# Patient Record
Sex: FEMALE | Race: WHITE | NOT HISPANIC OR LATINO | Employment: FULL TIME | URBAN - NONMETROPOLITAN AREA
[De-identification: names, ages, dates, MRNs, and addresses within clinical notes are randomized per-mention and may not be internally consistent; named-entity substitution may affect disease eponyms.]

---

## 2023-04-22 LAB
ALANINE AMINOTRANSFERASE (SGPT) (U/L) IN SER/PLAS: 22 U/L (ref 7–45)
ALBUMIN (G/DL) IN SER/PLAS: 3.9 G/DL (ref 3.4–5)
ALKALINE PHOSPHATASE (U/L) IN SER/PLAS: 81 U/L (ref 33–136)
ANION GAP IN SER/PLAS: 13 MMOL/L (ref 10–20)
ASPARTATE AMINOTRANSFERASE (SGOT) (U/L) IN SER/PLAS: 22 U/L (ref 9–39)
BASOPHILS (10*3/UL) IN BLOOD BY AUTOMATED COUNT: 0.04 X10E9/L (ref 0–0.1)
BASOPHILS/100 LEUKOCYTES IN BLOOD BY AUTOMATED COUNT: 0.6 % (ref 0–2)
BILIRUBIN TOTAL (MG/DL) IN SER/PLAS: 0.5 MG/DL (ref 0–1.2)
CALCIDIOL (25 OH VITAMIN D3) (NG/ML) IN SER/PLAS: 53 NG/ML
CALCIUM (MG/DL) IN SER/PLAS: 9.3 MG/DL (ref 8.6–10.6)
CARBON DIOXIDE, TOTAL (MMOL/L) IN SER/PLAS: 28 MMOL/L (ref 21–32)
CHLORIDE (MMOL/L) IN SER/PLAS: 104 MMOL/L (ref 98–107)
CHOLESTEROL (MG/DL) IN SER/PLAS: 125 MG/DL (ref 0–199)
CHOLESTEROL IN HDL (MG/DL) IN SER/PLAS: 48.2 MG/DL
CHOLESTEROL/HDL RATIO: 2.6
CREATININE (MG/DL) IN SER/PLAS: 0.88 MG/DL (ref 0.5–1.05)
EOSINOPHILS (10*3/UL) IN BLOOD BY AUTOMATED COUNT: 0.14 X10E9/L (ref 0–0.7)
EOSINOPHILS/100 LEUKOCYTES IN BLOOD BY AUTOMATED COUNT: 2 % (ref 0–6)
ERYTHROCYTE DISTRIBUTION WIDTH (RATIO) BY AUTOMATED COUNT: 13.2 % (ref 11.5–14.5)
ERYTHROCYTE MEAN CORPUSCULAR HEMOGLOBIN CONCENTRATION (G/DL) BY AUTOMATED: 31 G/DL (ref 32–36)
ERYTHROCYTE MEAN CORPUSCULAR VOLUME (FL) BY AUTOMATED COUNT: 98 FL (ref 80–100)
ERYTHROCYTES (10*6/UL) IN BLOOD BY AUTOMATED COUNT: 4.59 X10E12/L (ref 4–5.2)
GFR FEMALE: 72 ML/MIN/1.73M2
GLUCOSE (MG/DL) IN SER/PLAS: 97 MG/DL (ref 74–99)
HEMATOCRIT (%) IN BLOOD BY AUTOMATED COUNT: 45.1 % (ref 36–46)
HEMOGLOBIN (G/DL) IN BLOOD: 14 G/DL (ref 12–16)
IMMATURE GRANULOCYTES/100 LEUKOCYTES IN BLOOD BY AUTOMATED COUNT: 0.3 % (ref 0–0.9)
LDL: 49 MG/DL (ref 0–99)
LEUKOCYTES (10*3/UL) IN BLOOD BY AUTOMATED COUNT: 6.9 X10E9/L (ref 4.4–11.3)
LYMPHOCYTES (10*3/UL) IN BLOOD BY AUTOMATED COUNT: 1.77 X10E9/L (ref 1.2–4.8)
LYMPHOCYTES/100 LEUKOCYTES IN BLOOD BY AUTOMATED COUNT: 25.7 % (ref 13–44)
MONOCYTES (10*3/UL) IN BLOOD BY AUTOMATED COUNT: 0.6 X10E9/L (ref 0.1–1)
MONOCYTES/100 LEUKOCYTES IN BLOOD BY AUTOMATED COUNT: 8.7 % (ref 2–10)
NEUTROPHILS (10*3/UL) IN BLOOD BY AUTOMATED COUNT: 4.31 X10E9/L (ref 1.2–7.7)
NEUTROPHILS/100 LEUKOCYTES IN BLOOD BY AUTOMATED COUNT: 62.7 % (ref 40–80)
NRBC (PER 100 WBCS) BY AUTOMATED COUNT: 0 /100 WBC (ref 0–0)
PLATELETS (10*3/UL) IN BLOOD AUTOMATED COUNT: 120 X10E9/L (ref 150–450)
POTASSIUM (MMOL/L) IN SER/PLAS: 4.1 MMOL/L (ref 3.5–5.3)
PROTEIN TOTAL: 6.7 G/DL (ref 6.4–8.2)
SODIUM (MMOL/L) IN SER/PLAS: 141 MMOL/L (ref 136–145)
TRIGLYCERIDE (MG/DL) IN SER/PLAS: 138 MG/DL (ref 0–149)
UREA NITROGEN (MG/DL) IN SER/PLAS: 16 MG/DL (ref 6–23)
VLDL: 28 MG/DL (ref 0–40)

## 2023-06-22 ENCOUNTER — APPOINTMENT (OUTPATIENT)
Dept: PRIMARY CARE | Facility: CLINIC | Age: 68
End: 2023-06-22
Payer: MEDICARE

## 2023-07-12 DIAGNOSIS — G47.33 OSA (OBSTRUCTIVE SLEEP APNEA): ICD-10-CM

## 2023-07-12 PROBLEM — E78.5 DYSLIPIDEMIA: Status: ACTIVE | Noted: 2023-07-12

## 2023-07-12 PROBLEM — Q23.81 BICUSPID AORTIC VALVE: Status: ACTIVE | Noted: 2023-07-12

## 2023-07-12 PROBLEM — I10 HTN (HYPERTENSION): Status: ACTIVE | Noted: 2023-07-12

## 2023-07-12 PROBLEM — D69.6 THROMBOCYTOPENIA (CMS-HCC): Status: ACTIVE | Noted: 2023-07-12

## 2023-07-12 PROBLEM — R91.1 PULMONARY NODULE: Status: ACTIVE | Noted: 2023-07-12

## 2023-07-12 PROBLEM — Q23.1 BICUSPID AORTIC VALVE (HHS-HCC): Status: ACTIVE | Noted: 2023-07-12

## 2023-07-12 PROBLEM — E66.01 MORBID OBESITY WITH BODY MASS INDEX (BMI) OF 40.0 OR HIGHER (MULTI): Status: ACTIVE | Noted: 2023-07-12

## 2023-07-12 PROBLEM — E55.9 VITAMIN D DEFICIENCY: Status: ACTIVE | Noted: 2023-07-12

## 2023-07-12 PROBLEM — D64.9 ANEMIA: Status: ACTIVE | Noted: 2023-07-12

## 2023-07-12 PROBLEM — I35.0 MILD AORTIC STENOSIS: Status: ACTIVE | Noted: 2023-07-12

## 2023-07-12 PROBLEM — I25.10 CAD (CORONARY ARTERY DISEASE): Status: ACTIVE | Noted: 2023-07-12

## 2023-07-12 PROBLEM — N95.1 MENOPAUSAL STATE: Status: ACTIVE | Noted: 2023-07-12

## 2023-07-12 PROBLEM — F51.04 CHRONIC INSOMNIA: Status: ACTIVE | Noted: 2023-07-12

## 2023-07-12 PROBLEM — Z95.1 S/P CABG X 2: Status: ACTIVE | Noted: 2023-07-12

## 2023-07-12 PROBLEM — J44.9 COPD (CHRONIC OBSTRUCTIVE PULMONARY DISEASE) (MULTI): Status: ACTIVE | Noted: 2023-07-12

## 2023-07-12 PROBLEM — M19.90 ARTHRITIS: Status: ACTIVE | Noted: 2023-07-12

## 2023-07-12 RX ORDER — TRAZODONE HYDROCHLORIDE 100 MG/1
100 TABLET ORAL NIGHTLY
Qty: 90 TABLET | Refills: 0 | Status: SHIPPED | OUTPATIENT
Start: 2023-07-12 | End: 2023-08-23

## 2023-07-12 RX ORDER — TRAZODONE HYDROCHLORIDE 100 MG/1
1 TABLET ORAL NIGHTLY
COMMUNITY
Start: 2019-11-25 | End: 2023-07-12 | Stop reason: SDUPTHER

## 2023-07-27 ENCOUNTER — OFFICE VISIT (OUTPATIENT)
Dept: PRIMARY CARE | Facility: CLINIC | Age: 68
End: 2023-07-27
Payer: MEDICARE

## 2023-07-27 VITALS
DIASTOLIC BLOOD PRESSURE: 44 MMHG | HEART RATE: 79 BPM | OXYGEN SATURATION: 93 % | SYSTOLIC BLOOD PRESSURE: 116 MMHG | BODY MASS INDEX: 49.59 KG/M2 | WEIGHT: 293 LBS | RESPIRATION RATE: 16 BRPM | TEMPERATURE: 97.6 F

## 2023-07-27 DIAGNOSIS — E66.01 MORBID OBESITY WITH BODY MASS INDEX (BMI) OF 40.0 OR HIGHER (MULTI): ICD-10-CM

## 2023-07-27 DIAGNOSIS — F51.04 CHRONIC INSOMNIA: ICD-10-CM

## 2023-07-27 DIAGNOSIS — E78.2 MIXED HYPERLIPIDEMIA: Primary | ICD-10-CM

## 2023-07-27 DIAGNOSIS — I10 PRIMARY HYPERTENSION: ICD-10-CM

## 2023-07-27 DIAGNOSIS — G47.33 OBSTRUCTIVE SLEEP APNEA: ICD-10-CM

## 2023-07-27 DIAGNOSIS — E55.9 VITAMIN D DEFICIENCY: ICD-10-CM

## 2023-07-27 PROBLEM — H26.9 CORTICAL CATARACT OF BOTH EYES: Status: ACTIVE | Noted: 2021-10-20

## 2023-07-27 PROBLEM — M25.50 POLYARTHRALGIA: Status: RESOLVED | Noted: 2017-04-25 | Resolved: 2023-07-27

## 2023-07-27 PROBLEM — H52.13 MYOPIA OF BOTH EYES: Status: ACTIVE | Noted: 2021-10-20

## 2023-07-27 PROBLEM — R91.8 MULTIPLE LUNG NODULES: Status: ACTIVE | Noted: 2017-09-28

## 2023-07-27 PROBLEM — H25.13 CATARACT, NUCLEAR SCLEROTIC, BOTH EYES: Status: ACTIVE | Noted: 2021-10-20

## 2023-07-27 PROBLEM — H04.123 DRY EYE SYNDROME OF BOTH EYES: Status: ACTIVE | Noted: 2021-10-20

## 2023-07-27 PROBLEM — H16.223 KERATOCONJUNCTIVITIS SICCA OF BOTH EYES NOT SPECIFIED AS SJOGREN'S: Status: ACTIVE | Noted: 2021-09-21

## 2023-07-27 PROBLEM — D64.9 ANEMIA: Status: RESOLVED | Noted: 2023-07-12 | Resolved: 2023-07-27

## 2023-07-27 PROBLEM — H31.001 CHORIORETINAL SCAR OF RIGHT EYE: Status: ACTIVE | Noted: 2021-10-20

## 2023-07-27 PROBLEM — H02.88A MEIBOMIAN GLAND DYSFUNCTION (MGD) OF UPPER AND LOWER LIDS OF BOTH EYES: Status: ACTIVE | Noted: 2021-09-21

## 2023-07-27 PROBLEM — H02.88B MEIBOMIAN GLAND DYSFUNCTION (MGD) OF UPPER AND LOWER LIDS OF BOTH EYES: Status: ACTIVE | Noted: 2021-09-21

## 2023-07-27 PROBLEM — M25.50 POLYARTHRALGIA: Status: ACTIVE | Noted: 2017-04-25

## 2023-07-27 PROBLEM — Z95.2 STATUS POST COMBINED AORTIC ROOT AND VALVE REPLACEMENT USING STENTLESS BIOPROSTHETIC AORTIC VALVE: Status: ACTIVE | Noted: 2023-07-12

## 2023-07-27 PROBLEM — J44.9 COPD, MODERATE (MULTI): Status: ACTIVE | Noted: 2017-09-28

## 2023-07-27 PROBLEM — D12.2 ADENOMATOUS POLYP OF ASCENDING COLON: Status: ACTIVE | Noted: 2023-07-27

## 2023-07-27 PROBLEM — E78.5 DYSLIPIDEMIA: Status: RESOLVED | Noted: 2023-07-12 | Resolved: 2023-07-27

## 2023-07-27 PROBLEM — H52.4 PRESBYOPIA OF BOTH EYES: Status: ACTIVE | Noted: 2021-10-20

## 2023-07-27 PROBLEM — Z95.4 STATUS POST COMBINED AORTIC ROOT AND VALVE REPLACEMENT USING STENTLESS BIOPROSTHETIC AORTIC VALVE: Status: ACTIVE | Noted: 2023-07-12

## 2023-07-27 PROCEDURE — 1036F TOBACCO NON-USER: CPT | Performed by: FAMILY MEDICINE

## 2023-07-27 PROCEDURE — 1159F MED LIST DOCD IN RCRD: CPT | Performed by: FAMILY MEDICINE

## 2023-07-27 PROCEDURE — 99214 OFFICE O/P EST MOD 30 MIN: CPT | Performed by: FAMILY MEDICINE

## 2023-07-27 PROCEDURE — 3074F SYST BP LT 130 MM HG: CPT | Performed by: FAMILY MEDICINE

## 2023-07-27 PROCEDURE — 3078F DIAST BP <80 MM HG: CPT | Performed by: FAMILY MEDICINE

## 2023-07-27 PROCEDURE — 1125F AMNT PAIN NOTED PAIN PRSNT: CPT | Performed by: FAMILY MEDICINE

## 2023-07-27 RX ORDER — OMEPRAZOLE 20 MG/1
20 CAPSULE, DELAYED RELEASE ORAL
COMMUNITY
Start: 2018-02-09 | End: 2023-07-27 | Stop reason: ALTCHOICE

## 2023-07-27 RX ORDER — MULTIVIT-MIN/IRON/FOLIC ACID/K 18-600-40
CAPSULE ORAL
COMMUNITY
Start: 2019-10-22 | End: 2023-07-27 | Stop reason: ALTCHOICE

## 2023-07-27 RX ORDER — IBUPROFEN 600 MG/1
1 TABLET ORAL EVERY 6 HOURS
COMMUNITY
Start: 2022-07-18 | End: 2023-07-27 | Stop reason: ALTCHOICE

## 2023-07-27 RX ORDER — METOPROLOL TARTRATE 25 MG/1
25 TABLET, FILM COATED ORAL 2 TIMES DAILY
COMMUNITY
End: 2024-01-30 | Stop reason: SDUPTHER

## 2023-07-27 RX ORDER — OXAPROZIN 600 MG/1
600 TABLET, FILM COATED ORAL
COMMUNITY
Start: 2019-08-21 | End: 2023-07-27 | Stop reason: ALTCHOICE

## 2023-07-27 RX ORDER — HYDROCODONE BITARTRATE AND ACETAMINOPHEN 5; 325 MG/1; MG/1
TABLET ORAL
COMMUNITY
Start: 2022-08-29 | End: 2023-07-27 | Stop reason: ALTCHOICE

## 2023-07-27 RX ORDER — ALBUTEROL SULFATE 90 UG/1
2 AEROSOL, METERED RESPIRATORY (INHALATION) EVERY 4 HOURS PRN
COMMUNITY
Start: 2018-10-01 | End: 2023-07-27 | Stop reason: ALTCHOICE

## 2023-07-27 RX ORDER — METOPROLOL SUCCINATE 25 MG/1
25 TABLET, EXTENDED RELEASE ORAL
COMMUNITY
Start: 2019-08-14 | End: 2023-07-27 | Stop reason: SDUPTHER

## 2023-07-27 RX ORDER — ACETAMINOPHEN 325 MG/1
TABLET ORAL EVERY 6 HOURS
COMMUNITY
Start: 2022-04-22 | End: 2023-07-27 | Stop reason: ALTCHOICE

## 2023-07-27 RX ORDER — ASPIRIN 325 MG
325 TABLET ORAL
COMMUNITY
End: 2023-11-29

## 2023-07-27 RX ORDER — ROSUVASTATIN CALCIUM 40 MG/1
40 TABLET, COATED ORAL DAILY
COMMUNITY
End: 2024-01-17

## 2023-07-27 RX ORDER — MULTIVITAMIN
1 TABLET ORAL
COMMUNITY
Start: 2012-07-16 | End: 2024-04-01 | Stop reason: WASHOUT

## 2023-07-27 RX ORDER — PRAVASTATIN SODIUM 80 MG/1
80 TABLET ORAL
COMMUNITY
Start: 2019-08-14 | End: 2023-07-27

## 2023-07-27 NOTE — ASSESSMENT & PLAN NOTE
Obesity significant impact on breathing  Recommended rybelsus to see if can get affordable coverage for weight loss benefit  Rx 3mg sent

## 2023-07-27 NOTE — PROGRESS NOTES
Subjective     Patient ID: Arlene Siddiqui is a 68 y.o. female who presents for follow up of chronic medical conditions.    Tdap- 09/18/2022  Mammogram- 02/09/2023   Colonoscopy- 2 years ago [facility]     Insomnia- doing good     Hypertension evaluation.    Review of symptoms:  Fatigue: N  Headaches: N  Blurred vision: N   Palpitations: N  Chest pains: N   Shortness of Breath: N  Swelling of legs: N  Blood in urine: N   Taking medications daily: Y   Medication side effects: N  Problems with medication compliance:N  Baby Aspirin 81 mg daily: Y     High cholesterol evaluation.     Supplements: Y   specific diet plan: Y  exercising 30 min daily?: Y    Fatigue:N  Chest pains:N  Shortness of Breath:N  Sudden Headaches: N  Vision loss: N  Syncope (fainting): N   Leg Claudication (limping/leg tiredness): N   Taking medication daily: Y  Medication side effects:N    Cardiologist recommended she wean and stop metoprolol at last visit in May  During this weaning she had elevated 's and felt flushed and short period of double vision  So she stopped wean process and stayed on at 1 in am and 1/2 at night        Review of Systems    Objective   There were no vitals taken for this visit.  Physical Exam    Assessment/Plan   Problem List Items Addressed This Visit    None

## 2023-08-23 DIAGNOSIS — G47.33 OSA (OBSTRUCTIVE SLEEP APNEA): ICD-10-CM

## 2023-08-23 PROBLEM — I25.119 ATHEROSCLEROTIC HEART DISEASE OF NATIVE CORONARY ARTERY WITH UNSPECIFIED ANGINA PECTORIS (CMS-HCC): Status: ACTIVE | Noted: 2023-07-12

## 2023-08-23 RX ORDER — TRAZODONE HYDROCHLORIDE 100 MG/1
100 TABLET ORAL NIGHTLY
Qty: 90 TABLET | Refills: 1 | Status: SHIPPED | OUTPATIENT
Start: 2023-08-23 | End: 2024-01-16

## 2023-09-13 DIAGNOSIS — G47.33 OBSTRUCTIVE SLEEP APNEA: ICD-10-CM

## 2023-09-13 DIAGNOSIS — E66.01 MORBID OBESITY WITH BODY MASS INDEX (BMI) OF 40.0 OR HIGHER (MULTI): ICD-10-CM

## 2023-09-14 RX ORDER — ORAL SEMAGLUTIDE 3 MG/1
3 TABLET ORAL DAILY
Qty: 90 TABLET | Refills: 1 | OUTPATIENT
Start: 2023-09-14

## 2023-09-28 DIAGNOSIS — Z95.2 STATUS POST COMBINED AORTIC ROOT AND VALVE REPLACEMENT USING STENTLESS BIOPROSTHETIC AORTIC VALVE: Primary | ICD-10-CM

## 2023-09-28 DIAGNOSIS — Z95.4 STATUS POST COMBINED AORTIC ROOT AND VALVE REPLACEMENT USING STENTLESS BIOPROSTHETIC AORTIC VALVE: Primary | ICD-10-CM

## 2023-09-28 RX ORDER — AMOXICILLIN 500 MG/1
2000 CAPSULE ORAL ONCE
Qty: 12 CAPSULE | Refills: 1 | Status: SHIPPED | OUTPATIENT
Start: 2023-09-28 | End: 2023-09-28

## 2023-10-03 ENCOUNTER — LAB (OUTPATIENT)
Dept: LAB | Facility: LAB | Age: 68
End: 2023-10-03
Payer: MEDICARE

## 2023-10-03 DIAGNOSIS — D69.6 THROMBOCYTOPENIA, UNSPECIFIED (CMS-HCC): Primary | ICD-10-CM

## 2023-10-03 PROCEDURE — 36415 COLL VENOUS BLD VENIPUNCTURE: CPT

## 2023-10-04 LAB
ALBUMIN SERPL BCP-MCNC: 3.9 G/DL (ref 3.4–5)
ALP SERPL-CCNC: 81 U/L (ref 33–136)
ALT SERPL W P-5'-P-CCNC: 18 U/L (ref 7–45)
ANION GAP SERPL CALC-SCNC: 14 MMOL/L (ref 10–20)
AST SERPL W P-5'-P-CCNC: 19 U/L (ref 9–39)
BASOPHILS # BLD AUTO: 0.05 X10*3/UL (ref 0–0.1)
BASOPHILS NFR BLD AUTO: 0.8 %
BILIRUB SERPL-MCNC: 0.4 MG/DL (ref 0–1.2)
BUN SERPL-MCNC: 13 MG/DL (ref 6–23)
CALCIUM SERPL-MCNC: 9.6 MG/DL (ref 8.6–10.6)
CHLORIDE SERPL-SCNC: 106 MMOL/L (ref 98–107)
CO2 SERPL-SCNC: 26 MMOL/L (ref 21–32)
CREAT SERPL-MCNC: 0.79 MG/DL (ref 0.5–1.05)
EOSINOPHIL # BLD AUTO: 0.11 X10*3/UL (ref 0–0.7)
EOSINOPHIL NFR BLD AUTO: 1.8 %
ERYTHROCYTE [DISTWIDTH] IN BLOOD BY AUTOMATED COUNT: 13.2 % (ref 11.5–14.5)
GFR SERPL CREATININE-BSD FRML MDRD: 83 ML/MIN/1.73M*2
GLUCOSE SERPL-MCNC: 114 MG/DL (ref 74–99)
HCT VFR BLD AUTO: 49.5 % (ref 36–46)
HGB BLD-MCNC: 15.4 G/DL (ref 12–16)
IMM GRANULOCYTES # BLD AUTO: 0.02 X10*3/UL (ref 0–0.7)
IMM GRANULOCYTES NFR BLD AUTO: 0.3 % (ref 0–0.9)
LYMPHOCYTES # BLD AUTO: 1.59 X10*3/UL (ref 1.2–4.8)
LYMPHOCYTES NFR BLD AUTO: 26.3 %
MCH RBC QN AUTO: 31.5 PG (ref 26–34)
MCHC RBC AUTO-ENTMCNC: 31.1 G/DL (ref 32–36)
MCV RBC AUTO: 101 FL (ref 80–100)
MONOCYTES # BLD AUTO: 0.51 X10*3/UL (ref 0.1–1)
MONOCYTES NFR BLD AUTO: 8.4 %
NEUTROPHILS # BLD AUTO: 3.76 X10*3/UL (ref 1.2–7.7)
NEUTROPHILS NFR BLD AUTO: 62.4 %
NRBC BLD-RTO: 0 /100 WBCS (ref 0–0)
PLATELET # BLD AUTO: 120 X10*3/UL (ref 150–450)
PMV BLD AUTO: 13.8 FL (ref 7.5–11.5)
POTASSIUM SERPL-SCNC: 4.2 MMOL/L (ref 3.5–5.3)
PROT SERPL-MCNC: 6.8 G/DL (ref 6.4–8.2)
RBC # BLD AUTO: 4.89 X10*6/UL (ref 4–5.2)
SODIUM SERPL-SCNC: 142 MMOL/L (ref 136–145)
WBC # BLD AUTO: 6 X10*3/UL (ref 4.4–11.3)

## 2023-10-19 ASSESSMENT — PATIENT HEALTH QUESTIONNAIRE - PHQ9
2. FEELING DOWN, DEPRESSED OR HOPELESS: NOT AT ALL
5. POOR APPETITE OR OVEREATING: NOT AT ALL
1. LITTLE INTEREST OR PLEASURE IN DOING THINGS: NOT AT ALL
5. POOR APPETITE OR OVEREATING: NOT AT ALL
3. TROUBLE FALLING OR STAYING ASLEEP OR SLEEPING TOO MUCH: NOT AT ALL
9. THOUGHTS THAT YOU WOULD BE BETTER OFF DEAD, OR OF HURTING YOURSELF: 0
4. FEELING TIRED OR HAVING LITTLE ENERGY: NOT AT ALL
1. LITTLE INTEREST OR PLEASURE IN DOING THINGS: 0
7. TROUBLE CONCENTRATING ON THINGS, SUCH AS READING THE NEWSPAPER OR WATCHING TELEVISION: 0
7. TROUBLE CONCENTRATING ON THINGS, SUCH AS READING THE NEWSPAPER OR WATCHING TELEVISION: NOT AT ALL
8. MOVING OR SPEAKING SO SLOWLY THAT OTHER PEOPLE COULD HAVE NOTICED. OR THE OPPOSITE, BEING SO FIGETY OR RESTLESS THAT YOU HAVE BEEN MOVING AROUND A LOT MORE THAN USUAL: NOT AT ALL
2. FEELING DOWN, DEPRESSED, IRRITABLE, OR HOPELESS: NOT AT ALL
9. THOUGHTS THAT YOU WOULD BE BETTER OFF DEAD, OR OF HURTING YOURSELF: NOT AT ALL
6. FEELING BAD ABOUT YOURSELF - OR THAT YOU ARE A FAILURE OR HAVE LET YOURSELF OR YOUR FAMILY DOWN: NOT AT ALL
6. FEELING BAD ABOUT YOURSELF - OR THAT YOU ARE A FAILURE OR HAVE LET YOURSELF OR YOUR FAMILY DOWN: NOT AT ALL
5. POOR APPETITE OR OVEREATING: 0
3. TROUBLE FALLING OR STAYING ASLEEP OR SLEEPING TOO MUCH: 0
9. THOUGHTS THAT YOU WOULD BE BETTER OFF DEAD, OR OF HURTING YOURSELF: NOT AT ALL
8. MOVING OR SPEAKING SO SLOWLY THAT OTHER PEOPLE COULD HAVE NOTICED. OR THE OPPOSITE, BEING SO FIGETY OR RESTLESS THAT YOU HAVE BEEN MOVING AROUND A LOT MORE THAN USUAL: NOT AT ALL
4. FEELING TIRED OR HAVING LITTLE ENERGY: NOT AT ALL
8. MOVING OR SPEAKING SO SLOWLY THAT OTHER PEOPLE COULD HAVE NOTICED. OR THE OPPOSITE, BEING SO FIGETY OR RESTLESS THAT YOU HAVE BEEN MOVING AROUND A LOT MORE THAN USUAL: 0
1. LITTLE INTEREST OR PLEASURE IN DOING THINGS: NOT AT ALL
SUM OF ALL RESPONSES TO PHQ QUESTIONS 1-9: 0
6. FEELING BAD ABOUT YOURSELF - OR THAT YOU ARE A FAILURE OR HAVE LET YOURSELF OR YOUR FAMILY DOWN: 0
2. FEELING DOWN, DEPRESSED, IRRITABLE, OR HOPELESS: 0
4. FEELING TIRED OR HAVING LITTLE ENERGY: 0
7. TROUBLE CONCENTRATING ON THINGS, SUCH AS READING THE NEWSPAPER OR WATCHING TELEVISION: NOT AT ALL
3. TROUBLE FALLING OR STAYING ASLEEP OR SLEEPING TOO MUCH: NOT AT ALL
10. IF YOU CHECKED OFF ANY PROBLEMS, HOW DIFFICULT HAVE THESE PROBLEMS MADE IT FOR YOU TO DO YOUR WORK, TAKE CARE OF THINGS AT HOME, OR GET ALONG WITH OTHER PEOPLE: NOT DIFFICULT AT ALL
SUM OF ALL RESPONSES TO PHQ QUESTIONS 1-9: 0
10. IF YOU CHECKED OFF ANY PROBLEMS, HOW DIFFICULT HAVE THESE PROBLEMS MADE IT FOR YOU TO DO YOUR WORK, TAKE CARE OF THINGS AT HOME, OR GET ALONG WITH OTHER PEOPLE: NOT DIFFICULT AT ALL

## 2023-10-21 ENCOUNTER — TELEMEDICINE (OUTPATIENT)
Dept: PRIMARY CARE | Facility: CLINIC | Age: 68
End: 2023-10-21
Payer: MEDICARE

## 2023-10-21 DIAGNOSIS — J06.9 VIRAL URI WITH COUGH: ICD-10-CM

## 2023-10-21 DIAGNOSIS — R05.1 ACUTE COUGH: Primary | ICD-10-CM

## 2023-10-21 PROCEDURE — 99213 OFFICE O/P EST LOW 20 MIN: CPT | Performed by: NURSE PRACTITIONER

## 2023-10-21 RX ORDER — BENZONATATE 200 MG/1
200 CAPSULE ORAL 3 TIMES DAILY PRN
Qty: 42 CAPSULE | Refills: 0 | Status: SHIPPED | OUTPATIENT
Start: 2023-10-21 | End: 2023-11-20

## 2023-10-21 ASSESSMENT — ENCOUNTER SYMPTOMS
VOMITING: 0
NAUSEA: 0
CHILLS: 1
RHINORRHEA: 1
DIZZINESS: 0
LIGHT-HEADEDNESS: 0
COUGH: 1
HEADACHES: 0
SORE THROAT: 1
DIAPHORESIS: 0
APPETITE CHANGE: 0
SHORTNESS OF BREATH: 0
FEVER: 0
DIARRHEA: 0
ACTIVITY CHANGE: 0

## 2023-10-21 NOTE — PROGRESS NOTES
Subjective   Patient ID: Arleen Siddiqui is a 68 y.o. female who presents for URI.    COVID test was negative    URI   This is a new problem. The current episode started yesterday. Associated symptoms include congestion, coughing, rhinorrhea and a sore throat. Pertinent negatives include no chest pain, diarrhea, headaches, nausea, sneezing or vomiting.        Review of Systems   Constitutional:  Positive for chills. Negative for activity change, appetite change, diaphoresis and fever.   HENT:  Positive for congestion, postnasal drip, rhinorrhea and sore throat. Negative for sneezing.    Respiratory:  Positive for cough. Negative for shortness of breath.    Cardiovascular:  Negative for chest pain.   Gastrointestinal:  Negative for diarrhea, nausea and vomiting.   Neurological:  Negative for dizziness, light-headedness and headaches.       Objective   LMP  (LMP Unknown)     Physical Exam  Constitutional:       General: She is not in acute distress.     Appearance: Normal appearance. She is obese. She is ill-appearing.      Comments: Unable to perform complete physical exam due to virtual visit, patient was visualized on interactive video.    Pulmonary:      Effort: Pulmonary effort is normal.   Neurological:      Mental Status: She is alert and oriented to person, place, and time.         Assessment/Plan   Diagnoses and all orders for this visit:  Acute cough  -     benzonatate (Tessalon) 200 mg capsule; Take 1 capsule (200 mg) by mouth 3 times a day as needed for cough. Do not crush or chew.  Viral URI with cough  Take OTC heart safe cold/flu medication as needed, may take Tylenol as needed for fever/aches  Drink plenty of fluids, rest and warm liquids to sooth throat  Follow up with PCP if symptoms persist or worsen

## 2023-10-22 PROBLEM — Z95.1 S/P CABG X 2: Status: ACTIVE | Noted: 2023-10-22

## 2023-10-22 PROBLEM — I35.0 MILD AORTIC STENOSIS: Status: ACTIVE | Noted: 2023-10-22

## 2023-10-22 PROBLEM — Z87.891 FORMER CIGARETTE SMOKER: Status: ACTIVE | Noted: 2023-10-22

## 2023-10-22 PROBLEM — R91.1 PULMONARY NODULE: Status: ACTIVE | Noted: 2023-10-22

## 2023-10-22 PROBLEM — I25.10 CAD (CORONARY ARTERY DISEASE): Status: ACTIVE | Noted: 2023-10-22

## 2023-10-22 RX ORDER — ASPIRIN 81 MG/1
81 TABLET ORAL DAILY
COMMUNITY

## 2023-10-22 RX ORDER — IBUPROFEN 600 MG/1
600 TABLET ORAL EVERY 6 HOURS
COMMUNITY

## 2023-10-22 RX ORDER — ACETAMINOPHEN 325 MG/1
325 TABLET ORAL EVERY 6 HOURS PRN
COMMUNITY

## 2023-10-26 ENCOUNTER — OFFICE VISIT (OUTPATIENT)
Dept: HEMATOLOGY/ONCOLOGY | Facility: CLINIC | Age: 68
End: 2023-10-26
Payer: MEDICARE

## 2023-10-26 VITALS
BODY MASS INDEX: 49.76 KG/M2 | TEMPERATURE: 96.8 F | HEART RATE: 71 BPM | WEIGHT: 291.45 LBS | HEIGHT: 64 IN | SYSTOLIC BLOOD PRESSURE: 134 MMHG | OXYGEN SATURATION: 94 % | DIASTOLIC BLOOD PRESSURE: 82 MMHG | RESPIRATION RATE: 18 BRPM

## 2023-10-26 DIAGNOSIS — D69.6 THROMBOCYTOPENIA (CMS-HCC): Primary | ICD-10-CM

## 2023-10-26 PROCEDURE — 3075F SYST BP GE 130 - 139MM HG: CPT | Performed by: INTERNAL MEDICINE

## 2023-10-26 PROCEDURE — 99213 OFFICE O/P EST LOW 20 MIN: CPT | Performed by: INTERNAL MEDICINE

## 2023-10-26 PROCEDURE — 1036F TOBACCO NON-USER: CPT | Performed by: INTERNAL MEDICINE

## 2023-10-26 PROCEDURE — 1126F AMNT PAIN NOTED NONE PRSNT: CPT | Performed by: INTERNAL MEDICINE

## 2023-10-26 PROCEDURE — 1159F MED LIST DOCD IN RCRD: CPT | Performed by: INTERNAL MEDICINE

## 2023-10-26 PROCEDURE — 3079F DIAST BP 80-89 MM HG: CPT | Performed by: INTERNAL MEDICINE

## 2023-10-26 ASSESSMENT — PAIN SCALES - GENERAL: PAINLEVEL: 0-NO PAIN

## 2023-10-26 NOTE — PROGRESS NOTES
TED RICARDO is a 68 year old Female        Interval History:    The patient was referred to me by Dr. Espinoza for further evaluation and management of thrombocytopenia. The patient presents as a new patient on 09/29/2022.      The patient is a 67 year old with a past medical history significant for thrombocytopenia, anemia, multiple cardiac conditions, HLD, COPD,  MERLE, pulmonary nodules and a vitamin D deficiency. The patient recently underwent a CABG x3 and an aortic valve  replacement on 04/18/2022. At the time of surgery, the patient had a PLT level of 156 but since then the patient's PLT levels have dropped and she was subsequently referred to me for further evaluation and management.      At interview on 09/29/2022 the patient narrated her past medical history. The patient denies any history of fevers, night sweats, unexplained weight loss, shortness of breath, chest pain, constipation, confusion, nausea, vomiting, diarrhea, hemoptysis,  hematemesis, hematuria and hematochezia. No history of localized or systemic bleeding and infection.      The patient had come for a follow up on October 26, 2023 regarding history of thrombocytopenia. The patient presents to discus the results of the labs ordered at her last visit. She denies any new symptoms at this time.      Past Medical History:   1. Thrombocytopenia   2. Anemia  3. Multiple cardiac conditions  - CAD s/p CABG x3 on 04/18/2022   - Aortic stenosis s/p aortic valve replacement on 04/18/2022   - Mitral and tricuspid valve stenosis   - HTN   4. HLD  5. COPD  6. MERLE  7. Pulmonary nodules   8. Vitamin D deficiency  9. Morbid obesity      Past Surgical History:   1. CABG x 3 and aortic valve replacement on 04/18/2022   2. Cardiac cath with stent placement      Family Medical History:   1. Father with HTN and alcoholism   2. Mother with HTN   3. Sister with CAD w/ MI and HTN      Last mammogram was a few years ago.    Last colonoscopy was a few years ago.          Review of Systems:   ·  System Review All Systems:  Negative            Allergies and Intolerances:       Allergies:         codeine: Drug, Psychosis, Active     Outpatient Medication Profile:  * Patient Currently Takes Medications as of 28-Apr-2023 10:17 documented in Structured Notes         metoprolol tartrate 25 mg oral tablet : Last Dose Taken:  , 1 tab(s) orally 2 times a day, Start Date: 22-Apr-2022         acetaminophen 325 mg oral tablet: Last Dose Taken:  , 2 tab(s) orally  every 6 hours, As needed , Start Date: 22-Apr-2022         aspirin 81 mg oral tablet: Last Dose Taken:  , 1 tab(s) orally once a  day         traZODone 100 mg oral tablet: Last Dose Taken:  , 1 tab(s) orally once  a day         rosuvastatin 40 mg oral tablet: Last Dose Taken:  , 1 tab(s) orally once  a day         Multiple Vitamins oral capsule: Last Dose Taken:  , 1 cap(s) orally once  a day             Medical History:         Thrombocytopenia: ICD-10: D69.6, Status: Active         Status post left heart catheterization (LHC): ICD-10: Z98.890,  Status: Active        Social History:   Social Substance History:  ·  Smoking Status former smoker   ·  Tobacco Use history of abuse   ·  Alcohol Use daily   ·  Drug Use history of abuse   ·  Additional History     67 years old    with 1 daughter   Smoked 1.5 packs a day for 40 years and quit 2 years ago   Drinks 6 alcoholic drinks a week on average  (1)           Vitals and Measurements:   Vitals: Temp: 36.1  HR: 83  RR: 18  BP: 181/78  SPO2%:   94   Measurements: HT(cm): 161.3  WT(kg): 135.7  BSA:  2.46  BMI:  52.1      Physical Exam:      Constitutional: Well developed, awake/alert/oriented  x3, no distress, alert and cooperative   Eyes: PERRL, EOMI, clear sclera   ENMT: mucous membranes moist, no apparent injury,  no lesions seen   Head/Neck: Neck supple, no apparent injury, thyroid  without mass or tenderness, No JVD, trachea midline, no bruits   Respiratory/Thorax: Patent  airways, CTAB, normal  breath sounds with good chest expansion, thorax symmetric   Cardiovascular: Regular, rate and rhythm, no murmurs,  2+ equal pulses of the extremities, normal S 1and S 2   Gastrointestinal: Nondistended, soft, non-tender,  no rebound tenderness or guarding, no masses palpable, no organomegaly, +BS, no bruits   Genitourinary: No Discharge, vesicles or other abnormalities   Musculoskeletal: ROM intact, no joint swelling, normal  strength   Extremities: normal extremities, no cyanosis edema,  contusions or wounds, no clubbing   Neurological: alert and oriented x3, intact senses,  motor, response and reflexes, normal strength   Breast: No masses, tenderness, no discharge or discoloration   Lymphatic: No significant lymphadenopathy   Psychological: Appropriate mood and behavior   Skin: Warm and dry, no lesions, no rashes               Assessment and Plan:   Assessment:    1. Thrombocytopenia      The patient is a 67 year old with a past medical history significant for thrombocytopenia, anemia, multiple cardiac conditions, HLD, COPD,  MERLE, pulmonary nodules and a vitamin D deficiency. The patient recently underwent a CABG x3 and an aortic valve  replacement on 04/18/2022. At the time of surgery, the patient had a PLT level of 156 but since then the patient's PLT levels have dropped and she was subsequently referred to me for further evaluation and management.      Physical exam was within normal limits. I reviewed the lab data with the patient. CBC obtained on 09/01/2022 revealed WBC 5.3, HGB 14.8, HCT 47.4, PLT low at 78 and neut 3.35. Repeat CBC obtained today on 09/29/2022 revealed WBC 7.1, HGB 14.7, PLT now  improved up to 113, Neut 4.44. I had a detailed discussion with the patient and explained to the patient the role of PLT's in assisting with the coagulation and clotting process. I explained the significance or low PLT counts.  Differential diagnosis  for thrombocytopenia includes decreased  production, increased destruction, infection, inflammation, drug induction, deficiency state, autoimmune, platelet clumping vs others. PLT has normalized at this time. No therapeutic recommendations. I recommended  that the patient be reassessed in 4 weeks to see if PLT counts have held. If PLT continues to improve to WNL, then she will RTC PRN. If PLT is reassessed and decreases then we will proceed with further evaluation.      The patient had come for a follow up on October 26, 2023. Physical exam was within normal limits. I reviewed the lab data with the patient.  Platelet count 121,000/mm³.  The patient denies history of recurrent localized or systemic bleeding.  Possibility  of myelodysplastic syndrome does exist.  1 could consider a bone marrow aspiration and biopsy but since the patient is asymptomatic there would be no treatment the patient declined.  Hepatitis B antibody is negative.         2. Anemia  - Resolved at this time. HGB is WNL at a level of 14.7 g/dl.      3. Multiple cardiac conditions  - CAD s/p CABG x3 on 04/18/2022- Continue to take Aspirin 81 mg PO QD.   - Aortic stenosis s/p aortic valve replacement on 04/18/2022   - Mitral and tricuspid valve stenosis   - HTN- Continue to take Metoprolol tartrate 25 mg PO BID      4. HLD  - Rosuvastatin 40 mg PO QD      5. COPD     6. MERLE     7. Pulmonary nodules     8. Vitamin D deficiency     9. Morbid obesity

## 2023-10-26 NOTE — PROGRESS NOTES
Patient seen by Dr. Yu today in clinic. FUV in 3 mos with labs prior.  Pt obtains labs at [facility].  States she is on My Chart and able to view upcoming appts without difficulty.  Patient verbalizes understanding of plan of care via teachback method.

## 2023-11-18 ENCOUNTER — TELEPHONE (OUTPATIENT)
Dept: PHARMACY | Facility: HOSPITAL | Age: 68
End: 2023-11-18
Payer: MEDICARE

## 2023-11-18 NOTE — TELEPHONE ENCOUNTER
Population Health: Outreach by Ambulatory Pharmacy Team    Payor: [employer] [place]  Reason: Adherence  Medication: Rybelsus 3 mg  Outcome: Left Voicemail    Cinthia Avilez, PharmD

## 2023-11-29 ENCOUNTER — OFFICE VISIT (OUTPATIENT)
Dept: PRIMARY CARE | Facility: CLINIC | Age: 68
End: 2023-11-29
Payer: MEDICARE

## 2023-11-29 VITALS
BODY MASS INDEX: 51.71 KG/M2 | HEART RATE: 76 BPM | DIASTOLIC BLOOD PRESSURE: 81 MMHG | SYSTOLIC BLOOD PRESSURE: 127 MMHG | WEIGHT: 293 LBS | TEMPERATURE: 97.5 F | OXYGEN SATURATION: 94 %

## 2023-11-29 DIAGNOSIS — L03.116 CELLULITIS OF LEFT LOWER EXTREMITY: Primary | ICD-10-CM

## 2023-11-29 PROCEDURE — 1036F TOBACCO NON-USER: CPT | Performed by: FAMILY MEDICINE

## 2023-11-29 PROCEDURE — 1160F RVW MEDS BY RX/DR IN RCRD: CPT | Performed by: FAMILY MEDICINE

## 2023-11-29 PROCEDURE — 99214 OFFICE O/P EST MOD 30 MIN: CPT | Performed by: FAMILY MEDICINE

## 2023-11-29 PROCEDURE — 1126F AMNT PAIN NOTED NONE PRSNT: CPT | Performed by: FAMILY MEDICINE

## 2023-11-29 PROCEDURE — 3079F DIAST BP 80-89 MM HG: CPT | Performed by: FAMILY MEDICINE

## 2023-11-29 PROCEDURE — 1159F MED LIST DOCD IN RCRD: CPT | Performed by: FAMILY MEDICINE

## 2023-11-29 PROCEDURE — 3074F SYST BP LT 130 MM HG: CPT | Performed by: FAMILY MEDICINE

## 2023-11-29 RX ORDER — SULFAMETHOXAZOLE AND TRIMETHOPRIM 800; 160 MG/1; MG/1
1 TABLET ORAL 2 TIMES DAILY
Qty: 20 TABLET | Refills: 0 | Status: SHIPPED | OUTPATIENT
Start: 2023-11-29 | End: 2023-12-09

## 2023-11-29 NOTE — PROGRESS NOTES
" Subjective   Patient ID: Kandi Siddiqui \"Arlene\" is a 68 y.o. female who presents for Leg Sore (Pt notice leg sore on left calf yesterday. This happened in the past about a year ago after surgery and was diagnosed with cellulitis. ).  HPI    Yesterday noticed moisture/ drng at back of left lower leg.  Sl sore  .  Kept it covered, more drng overnight.   No fever. No injury / trauma .  Sl redness.   Has chronic brown/ gray discoloration of bilateral lower extr (anterior ) which is not different .     Hx of cellulitis in the past .      Review of Systems    Objective   /81 (BP Location: Left arm, Patient Position: Sitting, BP Cuff Size: Large adult)   Pulse 76   Temp 36.4 °C (97.5 °F) (Temporal)   Wt 135 kg (296 lb 9.6 oz)   LMP  (LMP Unknown)   SpO2 94%   BMI 51.71 kg/m²     Physical Exam  Musculoskeletal:        Legs:      Alert. No distress.     3 x 4.5 cm  erythematous skin in approx a rectangular shape,  back of left lower leg . Not involving heel/ foot /    No pain over achilles. No fluctuance.   1 round scab, dark brown .  3 round red excoriated lesions . Wounds are dry . Largest is the dk brown scab, which is size of pencil head eraser   Mild warmth .  Minimally sore to palpation .       Assessment/Plan   Problem List Items Addressed This Visit    None  Visit Diagnoses       Cellulitis of left lower extremity    -  Primary    Relevant Medications    sulfamethoxazole-trimethoprim (Bactrim DS) 800-160 mg tablet          3x 4.5 cm      3 round red areas    1 round scab     All these are smaller than pencil head eraser     Expect will resolve with oral antibx. Pt states she has taken picture of the area  it is diff to directly visualize because of location. Spouse can help with checking it and will applying cover. Currently it is dry.  Can cover with nonstick gauze at night to protect clothing/ avoid friction irritation    Okay to use small amt of plain vaseline if skin it itchy/ dry, as it may be as " it heals.         WHITNEY Montoya MD

## 2024-01-08 DIAGNOSIS — E55.9 VITAMIN D DEFICIENCY: ICD-10-CM

## 2024-01-08 DIAGNOSIS — E78.2 MIXED HYPERLIPIDEMIA: Primary | ICD-10-CM

## 2024-01-08 DIAGNOSIS — I10 PRIMARY HYPERTENSION: ICD-10-CM

## 2024-01-08 DIAGNOSIS — D69.6 THROMBOCYTOPENIA (CMS-HCC): ICD-10-CM

## 2024-01-15 DIAGNOSIS — G47.33 OSA (OBSTRUCTIVE SLEEP APNEA): ICD-10-CM

## 2024-01-16 RX ORDER — TRAZODONE HYDROCHLORIDE 100 MG/1
100 TABLET ORAL NIGHTLY
Qty: 90 TABLET | Refills: 3 | Status: SHIPPED | OUTPATIENT
Start: 2024-01-16 | End: 2024-01-30 | Stop reason: SDUPTHER

## 2024-01-17 DIAGNOSIS — E78.5 HYPERLIPIDEMIA, UNSPECIFIED HYPERLIPIDEMIA TYPE: Primary | ICD-10-CM

## 2024-01-17 RX ORDER — ROSUVASTATIN CALCIUM 40 MG/1
40 TABLET, COATED ORAL DAILY
Qty: 100 TABLET | Refills: 1 | Status: SHIPPED | OUTPATIENT
Start: 2024-01-17

## 2024-01-23 ENCOUNTER — LAB (OUTPATIENT)
Dept: LAB | Facility: LAB | Age: 69
End: 2024-01-23
Payer: MEDICARE

## 2024-01-23 DIAGNOSIS — I10 PRIMARY HYPERTENSION: ICD-10-CM

## 2024-01-23 DIAGNOSIS — E78.2 MIXED HYPERLIPIDEMIA: ICD-10-CM

## 2024-01-23 DIAGNOSIS — D69.6 THROMBOCYTOPENIA (CMS-HCC): ICD-10-CM

## 2024-01-23 DIAGNOSIS — E55.9 VITAMIN D DEFICIENCY: ICD-10-CM

## 2024-01-23 PROCEDURE — 85025 COMPLETE CBC W/AUTO DIFF WBC: CPT

## 2024-01-23 PROCEDURE — 80061 LIPID PANEL: CPT

## 2024-01-23 PROCEDURE — 36415 COLL VENOUS BLD VENIPUNCTURE: CPT

## 2024-01-23 PROCEDURE — 80053 COMPREHEN METABOLIC PANEL: CPT

## 2024-01-23 PROCEDURE — 82306 VITAMIN D 25 HYDROXY: CPT

## 2024-01-24 LAB
25(OH)D3 SERPL-MCNC: 58 NG/ML (ref 30–100)
ALBUMIN SERPL BCP-MCNC: 4 G/DL (ref 3.4–5)
ALP SERPL-CCNC: 88 U/L (ref 33–136)
ALT SERPL W P-5'-P-CCNC: 16 U/L (ref 7–45)
ANION GAP SERPL CALC-SCNC: 14 MMOL/L (ref 10–20)
AST SERPL W P-5'-P-CCNC: 17 U/L (ref 9–39)
BASOPHILS # BLD AUTO: 0.04 X10*3/UL (ref 0–0.1)
BASOPHILS NFR BLD AUTO: 0.6 %
BILIRUB SERPL-MCNC: 0.5 MG/DL (ref 0–1.2)
BUN SERPL-MCNC: 15 MG/DL (ref 6–23)
CALCIUM SERPL-MCNC: 9.3 MG/DL (ref 8.6–10.6)
CHLORIDE SERPL-SCNC: 103 MMOL/L (ref 98–107)
CHOLEST SERPL-MCNC: 136 MG/DL (ref 0–199)
CHOLESTEROL/HDL RATIO: 2.8
CO2 SERPL-SCNC: 28 MMOL/L (ref 21–32)
CREAT SERPL-MCNC: 0.79 MG/DL (ref 0.5–1.05)
EGFRCR SERPLBLD CKD-EPI 2021: 83 ML/MIN/1.73M*2
EOSINOPHIL # BLD AUTO: 0.12 X10*3/UL (ref 0–0.7)
EOSINOPHIL NFR BLD AUTO: 1.7 %
ERYTHROCYTE [DISTWIDTH] IN BLOOD BY AUTOMATED COUNT: 12.7 % (ref 11.5–14.5)
GLUCOSE SERPL-MCNC: 117 MG/DL (ref 74–99)
HCT VFR BLD AUTO: 46.5 % (ref 36–46)
HDLC SERPL-MCNC: 48.4 MG/DL
HGB BLD-MCNC: 14.5 G/DL (ref 12–16)
IMM GRANULOCYTES # BLD AUTO: 0.04 X10*3/UL (ref 0–0.7)
IMM GRANULOCYTES NFR BLD AUTO: 0.6 % (ref 0–0.9)
LDLC SERPL CALC-MCNC: 53 MG/DL
LYMPHOCYTES # BLD AUTO: 1.83 X10*3/UL (ref 1.2–4.8)
LYMPHOCYTES NFR BLD AUTO: 25.8 %
MCH RBC QN AUTO: 30.5 PG (ref 26–34)
MCHC RBC AUTO-ENTMCNC: 31.2 G/DL (ref 32–36)
MCV RBC AUTO: 98 FL (ref 80–100)
MONOCYTES # BLD AUTO: 0.58 X10*3/UL (ref 0.1–1)
MONOCYTES NFR BLD AUTO: 8.2 %
NEUTROPHILS # BLD AUTO: 4.47 X10*3/UL (ref 1.2–7.7)
NEUTROPHILS NFR BLD AUTO: 63.1 %
NON HDL CHOLESTEROL: 88 MG/DL (ref 0–149)
NRBC BLD-RTO: 0 /100 WBCS (ref 0–0)
PLATELET # BLD AUTO: 120 X10*3/UL (ref 150–450)
POTASSIUM SERPL-SCNC: 4.1 MMOL/L (ref 3.5–5.3)
PROT SERPL-MCNC: 6.6 G/DL (ref 6.4–8.2)
RBC # BLD AUTO: 4.76 X10*6/UL (ref 4–5.2)
SODIUM SERPL-SCNC: 141 MMOL/L (ref 136–145)
TRIGL SERPL-MCNC: 174 MG/DL (ref 0–149)
VLDL: 35 MG/DL (ref 0–40)
WBC # BLD AUTO: 7.1 X10*3/UL (ref 4.4–11.3)

## 2024-01-30 ENCOUNTER — OFFICE VISIT (OUTPATIENT)
Dept: PRIMARY CARE | Facility: CLINIC | Age: 69
End: 2024-01-30
Payer: MEDICARE

## 2024-01-30 VITALS
SYSTOLIC BLOOD PRESSURE: 128 MMHG | RESPIRATION RATE: 14 BRPM | DIASTOLIC BLOOD PRESSURE: 84 MMHG | OXYGEN SATURATION: 94 % | TEMPERATURE: 97.6 F | HEART RATE: 75 BPM | WEIGHT: 293 LBS | BODY MASS INDEX: 52.13 KG/M2

## 2024-01-30 DIAGNOSIS — Z87.891 FORMER CIGARETTE SMOKER: ICD-10-CM

## 2024-01-30 DIAGNOSIS — I10 PRIMARY HYPERTENSION: ICD-10-CM

## 2024-01-30 DIAGNOSIS — E55.9 VITAMIN D DEFICIENCY: ICD-10-CM

## 2024-01-30 DIAGNOSIS — E66.01 MORBID OBESITY WITH BODY MASS INDEX (BMI) OF 40.0 OR HIGHER (MULTI): ICD-10-CM

## 2024-01-30 DIAGNOSIS — D12.2 ADENOMATOUS POLYP OF ASCENDING COLON: ICD-10-CM

## 2024-01-30 DIAGNOSIS — Z95.2 STATUS POST COMBINED AORTIC ROOT AND VALVE REPLACEMENT USING STENTLESS BIOPROSTHETIC AORTIC VALVE: ICD-10-CM

## 2024-01-30 DIAGNOSIS — Z87.891 PERSONAL HISTORY OF NICOTINE DEPENDENCE: ICD-10-CM

## 2024-01-30 DIAGNOSIS — R91.8 MULTIPLE LUNG NODULES: ICD-10-CM

## 2024-01-30 DIAGNOSIS — Z95.4 STATUS POST COMBINED AORTIC ROOT AND VALVE REPLACEMENT USING STENTLESS BIOPROSTHETIC AORTIC VALVE: ICD-10-CM

## 2024-01-30 DIAGNOSIS — Z95.1 S/P CABG X 2: ICD-10-CM

## 2024-01-30 DIAGNOSIS — Z12.31 BREAST CANCER SCREENING BY MAMMOGRAM: ICD-10-CM

## 2024-01-30 DIAGNOSIS — G47.33 OSA (OBSTRUCTIVE SLEEP APNEA): ICD-10-CM

## 2024-01-30 DIAGNOSIS — I77.810 MILD DILATION OF ASCENDING AORTA (CMS-HCC): ICD-10-CM

## 2024-01-30 DIAGNOSIS — Z00.00 ROUTINE GENERAL MEDICAL EXAMINATION AT HEALTH CARE FACILITY: ICD-10-CM

## 2024-01-30 DIAGNOSIS — E78.5 HYPERLIPIDEMIA, UNSPECIFIED HYPERLIPIDEMIA TYPE: Primary | ICD-10-CM

## 2024-01-30 DIAGNOSIS — G47.33 OBSTRUCTIVE SLEEP APNEA: ICD-10-CM

## 2024-01-30 DIAGNOSIS — D69.6 THROMBOCYTOPENIA (CMS-HCC): ICD-10-CM

## 2024-01-30 PROBLEM — R91.1 PULMONARY NODULE: Status: RESOLVED | Noted: 2023-10-22 | Resolved: 2024-01-30

## 2024-01-30 PROBLEM — I25.119 ATHEROSCLEROTIC HEART DISEASE OF NATIVE CORONARY ARTERY WITH UNSPECIFIED ANGINA PECTORIS (CMS-HCC): Status: RESOLVED | Noted: 2023-07-12 | Resolved: 2024-01-30

## 2024-01-30 PROCEDURE — G0439 PPPS, SUBSEQ VISIT: HCPCS | Performed by: FAMILY MEDICINE

## 2024-01-30 PROCEDURE — 1160F RVW MEDS BY RX/DR IN RCRD: CPT | Performed by: FAMILY MEDICINE

## 2024-01-30 PROCEDURE — 1036F TOBACCO NON-USER: CPT | Performed by: FAMILY MEDICINE

## 2024-01-30 PROCEDURE — 1123F ACP DISCUSS/DSCN MKR DOCD: CPT | Performed by: FAMILY MEDICINE

## 2024-01-30 PROCEDURE — 99214 OFFICE O/P EST MOD 30 MIN: CPT | Performed by: FAMILY MEDICINE

## 2024-01-30 PROCEDURE — 1170F FXNL STATUS ASSESSED: CPT | Performed by: FAMILY MEDICINE

## 2024-01-30 PROCEDURE — 3074F SYST BP LT 130 MM HG: CPT | Performed by: FAMILY MEDICINE

## 2024-01-30 PROCEDURE — 3079F DIAST BP 80-89 MM HG: CPT | Performed by: FAMILY MEDICINE

## 2024-01-30 PROCEDURE — 1126F AMNT PAIN NOTED NONE PRSNT: CPT | Performed by: FAMILY MEDICINE

## 2024-01-30 PROCEDURE — 1159F MED LIST DOCD IN RCRD: CPT | Performed by: FAMILY MEDICINE

## 2024-01-30 RX ORDER — METOPROLOL TARTRATE 25 MG/1
25 TABLET, FILM COATED ORAL 2 TIMES DAILY
Qty: 90 TABLET | Refills: 3 | Status: SHIPPED | OUTPATIENT
Start: 2024-01-30 | End: 2024-01-30 | Stop reason: SDUPTHER

## 2024-01-30 RX ORDER — TRAZODONE HYDROCHLORIDE 100 MG/1
100 TABLET ORAL NIGHTLY
Qty: 90 TABLET | Refills: 3 | Status: SHIPPED | OUTPATIENT
Start: 2024-01-30 | End: 2024-01-30 | Stop reason: SDUPTHER

## 2024-01-30 RX ORDER — MULTIVIT-MIN/IRON/FOLIC ACID/K 18-600-40
CAPSULE ORAL
COMMUNITY
Start: 2019-10-22

## 2024-01-30 RX ORDER — TRAZODONE HYDROCHLORIDE 100 MG/1
100 TABLET ORAL NIGHTLY
Qty: 90 TABLET | Refills: 3 | Status: SHIPPED | OUTPATIENT
Start: 2024-01-30

## 2024-01-30 RX ORDER — METOPROLOL TARTRATE 25 MG/1
25 TABLET, FILM COATED ORAL 2 TIMES DAILY
Qty: 180 TABLET | Refills: 3 | Status: SHIPPED | OUTPATIENT
Start: 2024-01-30

## 2024-01-30 ASSESSMENT — ACTIVITIES OF DAILY LIVING (ADL)
DRESSING: INDEPENDENT
GROCERY_SHOPPING: INDEPENDENT
DOING_HOUSEWORK: INDEPENDENT
TAKING_MEDICATION: INDEPENDENT
BATHING: INDEPENDENT
MANAGING_FINANCES: INDEPENDENT

## 2024-01-30 ASSESSMENT — ENCOUNTER SYMPTOMS: EYE DISCHARGE: 1

## 2024-01-30 ASSESSMENT — PATIENT HEALTH QUESTIONNAIRE - PHQ9
1. LITTLE INTEREST OR PLEASURE IN DOING THINGS: NOT AT ALL
SUM OF ALL RESPONSES TO PHQ9 QUESTIONS 1 AND 2: 0
2. FEELING DOWN, DEPRESSED OR HOPELESS: NOT AT ALL

## 2024-01-30 NOTE — ASSESSMENT & PLAN NOTE
Blood pressure in office:  BP Readings from Last 1 Encounters:   01/30/24 128/84   The goal range for blood pressure is below 130/80.   We will continue to monitor.

## 2024-01-30 NOTE — PROGRESS NOTES
"Subjective   Reason for Visit: Kandi Siddiqui is an 68 y.o. female here for a Medicare Wellness visit.         Reviewed all medications by prescribing practitioner or clinical pharmacist (such as prescriptions, OTCs, herbal therapies and supplements) and documented in the medical record.    She reports that frequently she is unable to open her eyes when she first gets up, they are \"glued shut\". She is seeing an eye doctor regularly.   She plans to get her flu vaccine within a few weeks at her local drug store. She is wondering if she should also get the RSV vaccine. She only received two Covid vaccines when they first came out.   She continues to follow with Dr. Yu, oncology, and Dr. Millan, cardiology. She has appointments with both coming up over the next few months.   She quit smoking in 2020 after 40 years.        Patient Care Team:  Abilio Espinoza MD as PCP - General  Abilio Espinoza MD as PCP - [employer] PCP     Review of Systems   Eyes:  Positive for discharge.       Objective   Vitals:  /84 (BP Location: Left arm, Patient Position: Sitting, BP Cuff Size: Large adult)   Pulse 75   Temp 36.4 °C (97.6 °F) (Temporal)   Resp 14   Wt 136 kg (299 lb)   LMP  (LMP Unknown)   SpO2 94%   BMI 52.13 kg/m²       Physical Exam  Constitutional:       Appearance: Normal appearance. She is obese.   HENT:      Mouth/Throat:      Mouth: Mucous membranes are moist.      Pharynx: Oropharynx is clear.   Eyes:      Conjunctiva/sclera: Conjunctivae normal.   Cardiovascular:      Rate and Rhythm: Normal rate and regular rhythm.      Pulses: Normal pulses.      Heart sounds: Normal heart sounds.   Pulmonary:      Effort: Pulmonary effort is normal.      Breath sounds: Normal breath sounds.   Neurological:      General: No focal deficit present.      Mental Status: She is alert.   Psychiatric:         Mood and Affect: Mood normal.         Behavior: Behavior normal.         Thought Content: Thought " content normal.         Judgment: Judgment normal.         Assessment/Plan   Problem List Items Addressed This Visit       Obstructive sleep apnea    Overview     Formatting of this note might be different from the original. Untreated. Unable to tolerate PAP         Relevant Medications    traZODone (Desyrel) 100 mg tablet    Multiple lung nodules    Overview     Formatting of this note might be different from the original. Unchanged from 2015 to 2017         Current Assessment & Plan     Annual low dose CT for lung cancer screening  2/2023 stable  Repeat 2/2024, order placed.         Thrombocytopenia (CMS/[facility])    Current Assessment & Plan     Monitored Dr Yu  Stable  4/21/2023= 120,000         Vitamin D deficiency    Current Assessment & Plan     4/2023=53  stable         Primary hypertension    Current Assessment & Plan     Blood pressure in office:  BP Readings from Last 1 Encounters:   01/30/24 128/84   The goal range for blood pressure is below 130/80.   We will continue to monitor.          Relevant Medications    metoprolol tartrate (Lopressor) 25 mg tablet    Morbid obesity with body mass index (BMI) of 40.0 or higher (CMS/[facility])    Current Assessment & Plan     Body mass index is 52.13 kg/m².   Ideal weight is BMI 25 or under.  Think of healthy lifestyle changes rather than a diet  Weight loss is 75% diet and 25% exercise   Think of daily plate that includes 50% vegetables and do not count peas, corn, white potatoes as a vegetable. 25% complex grains/starch, 25% protein/fat.  Ideal diet is predominately plant based - Vegetables and Fruit. Protein from non meat sources ideal (whole beans, chickpeas). If choosing meat source for protein - first choice is fish in omega-3 such as [place]. Next choice is skinless poultry and last choice and infrequent should be red meat.  Weight loss can be helped through mindful eating. There are apps that can be used to assist with keeping track of your food water and  exercise, such as My fitness pal Can see nutritionist if preferred.   Losing 4-6 lbs a month is a sufficient means of gradually losing and maintaining weight loss (good healthy, long term weight loss).          Hyperlipidemia, unspecified - Primary    Current Assessment & Plan     Lab Results   Component Value Date    CHOL 136 01/23/2024    CHOL 125 04/21/2023    CHOL 131 12/13/2021     Lab Results   Component Value Date    HDL 48.4 01/23/2024    HDL 48.2 04/21/2023    HDL 42.8 12/13/2021     Lab Results   Component Value Date    LDLCALC 53 01/23/2024     Lab Results   Component Value Date    TRIG 174 (H) 01/23/2024    TRIG 138 04/21/2023    TRIG 182 (H) 12/13/2021   Stable.  Continue rosuvastatin for secondary cardiovascular prevention.         Mild dilation of ascending aorta (CMS/HCC)    Adenomatous polyp of ascending colon    Current Assessment & Plan     Colonoscopy 2021  Due 2026         Former cigarette smoker    Current Assessment & Plan     40 year history, quit in 2020.          Other Visit Diagnoses       MERLE (obstructive sleep apnea)        Relevant Medications    traZODone (Desyrel) 100 mg tablet    Personal history of nicotine dependence        Relevant Orders    CT lung screening low dose    Breast cancer screening by mammogram        Relevant Orders    BI mammo bilateral screening tomosynthesis            Scribe Attestation  By signing my name below, I, Pamela Suero , Scribe   attest that this documentation has been prepared under the direction and in the presence of Abilio Espinoza MD.

## 2024-01-30 NOTE — ASSESSMENT & PLAN NOTE
Body mass index is 52.13 kg/m².   Ideal weight is BMI 25 or under.  Think of healthy lifestyle changes rather than a diet  Weight loss is 75% diet and 25% exercise   Think of daily plate that includes 50% vegetables and do not count peas, corn, white potatoes as a vegetable. 25% complex grains/starch, 25% protein/fat.  Ideal diet is predominately plant based - Vegetables and Fruit. Protein from non meat sources ideal (whole beans, chickpeas). If choosing meat source for protein - first choice is fish in omega-3 such as [place]. Next choice is skinless poultry and last choice and infrequent should be red meat.  Weight loss can be helped through mindful eating. There are apps that can be used to assist with keeping track of your food water and exercise, such as My fitness pal Can see nutritionist if preferred.   Losing 4-6 lbs a month is a sufficient means of gradually losing and maintaining weight loss (good healthy, long term weight loss).

## 2024-01-30 NOTE — ASSESSMENT & PLAN NOTE
Lab Results   Component Value Date    CHOL 136 01/23/2024    CHOL 125 04/21/2023    CHOL 131 12/13/2021     Lab Results   Component Value Date    HDL 48.4 01/23/2024    HDL 48.2 04/21/2023    HDL 42.8 12/13/2021     Lab Results   Component Value Date    LDLCALC 53 01/23/2024     Lab Results   Component Value Date    TRIG 174 (H) 01/23/2024    TRIG 138 04/21/2023    TRIG 182 (H) 12/13/2021   Stable.  Continue rosuvastatin for secondary cardiovascular prevention.   Refill sucralfate    [facility]     Thank you

## 2024-01-30 NOTE — ASSESSMENT & PLAN NOTE
replacement of aortic root with 25 freestyle, ascending aorta and Alexis arch with a 26 Gelweave, and 2 bypass grafts: Left mammary to LAD and saphenous vein to RCA April 18, 2022

## 2024-01-30 NOTE — ASSESSMENT & PLAN NOTE
replacement of aortic root with 25 freestyle, ascending aorta and Alexis arch with a 26 Gelweave, and 2 bypass grafts: Left mammary to LAD and saphenous vein to RCA April 18, 2022    No symptoms of angina    Yearly cardiology eval in spring w Dr Manzano    She desires transition of cardiology - will get her set up with Dr Snider

## 2024-02-02 PROBLEM — J44.9 COPD, MODERATE (MULTI): Status: RESOLVED | Noted: 2017-09-28 | Resolved: 2024-02-02

## 2024-02-02 PROBLEM — Q23.81 BICUSPID AORTIC VALVE: Status: RESOLVED | Noted: 2023-07-12 | Resolved: 2024-02-02

## 2024-02-02 PROBLEM — I35.0 NONRHEUMATIC AORTIC (VALVE) STENOSIS: Status: RESOLVED | Noted: 2023-07-12 | Resolved: 2024-02-02

## 2024-02-02 PROBLEM — I35.0 MILD AORTIC STENOSIS: Status: RESOLVED | Noted: 2023-10-22 | Resolved: 2024-02-02

## 2024-02-02 PROBLEM — Q23.1 BICUSPID AORTIC VALVE (HHS-HCC): Status: RESOLVED | Noted: 2023-07-12 | Resolved: 2024-02-02

## 2024-02-02 NOTE — ASSESSMENT & PLAN NOTE
No symptoms of angina    Yearly cardiology eval in spring w Dr Manzano    She desires transition of cardiology - will get her set up with Dr Snider

## 2024-02-02 NOTE — PROGRESS NOTES
Subjective   Reason for Visit: Kandi Siddiqui is an 68 y.o. female here for a Medicare Wellness visit.               HPI    Patient Care Team:  Abilio Espinoza MD as PCP - General  Abilio Espinoza MD as PCP - [employer] PCP     Review of Systems    Objective   Vitals:  /84 (BP Location: Left arm, Patient Position: Sitting, BP Cuff Size: Large adult)   Pulse 75   Temp 36.4 °C (97.6 °F) (Temporal)   Resp 14   Wt 136 kg (299 lb)   LMP  (LMP Unknown)   SpO2 94%   BMI 52.13 kg/m²       Physical Exam    Assessment/Plan   Problem List Items Addressed This Visit       Obstructive sleep apnea    Overview     Formatting of this note might be different from the original. Untreated. Unable to tolerate PAP         Current Assessment & Plan     Intolerant of CPAP         Relevant Medications    traZODone (Desyrel) 100 mg tablet    Multiple lung nodules    Overview     Formatting of this note might be different from the original. Unchanged from 2015 to 2017         Current Assessment & Plan     Annual low dose CT for lung cancer screening  2/2023 stable  Repeat 2/2024, order placed.         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    Current Assessment & Plan     replacement of aortic root with 25 freestyle, ascending aorta and Alexis arch with a 26 Gelweave, and 2 bypass grafts: Left mammary to LAD and saphenous vein to RCA April 18, 2022         Thrombocytopenia (CMS/HCC)    Current Assessment & Plan     Monitored Dr Yu  Stable  4/21/2023= 120,000         Vitamin D deficiency    Current Assessment & Plan     4/2023=53  stable         Primary hypertension    Current Assessment & Plan     Blood pressure in office:  BP Readings from Last 1 Encounters:   01/30/24 128/84   The goal range for blood pressure is below 130/80.   We will continue to monitor.          Relevant Medications    metoprolol tartrate (Lopressor) 25 mg tablet    Morbid obesity  with body mass index (BMI) of 40.0 or higher (CMS/HCC)    Current Assessment & Plan     Body mass index is 52.13 kg/m².   Ideal weight is BMI 25 or under.  Think of healthy lifestyle changes rather than a diet  Weight loss is 75% diet and 25% exercise   Think of daily plate that includes 50% vegetables and do not count peas, corn, white potatoes as a vegetable. 25% complex grains/starch, 25% protein/fat.  Ideal diet is predominately plant based - Vegetables and Fruit. Protein from non meat sources ideal (whole beans, chickpeas). If choosing meat source for protein - first choice is fish in omega-3 such as [place]. Next choice is skinless poultry and last choice and infrequent should be red meat.  Weight loss can be helped through mindful eating. There are apps that can be used to assist with keeping track of your food water and exercise, such as My fitness pal Can see nutritionist if preferred.   Losing 4-6 lbs a month is a sufficient means of gradually losing and maintaining weight loss (good healthy, long term weight loss).          Hyperlipidemia, unspecified - Primary    Current Assessment & Plan     Lab Results   Component Value Date    CHOL 136 01/23/2024    CHOL 125 04/21/2023    CHOL 131 12/13/2021     Lab Results   Component Value Date    HDL 48.4 01/23/2024    HDL 48.2 04/21/2023    HDL 42.8 12/13/2021     Lab Results   Component Value Date    LDLCALC 53 01/23/2024     Lab Results   Component Value Date    TRIG 174 (H) 01/23/2024    TRIG 138 04/21/2023    TRIG 182 (H) 12/13/2021   Stable.  Continue rosuvastatin for secondary cardiovascular prevention.         Mild dilation of ascending aorta (CMS/HCC)    Adenomatous polyp of ascending colon    Current Assessment & Plan     Colonoscopy 2021  Due 2026         Former cigarette smoker    Current Assessment & Plan     40 year history, quit in 2020.         S/P CABG x 2    Current Assessment & Plan     replacement of aortic root with 25 freestyle, ascending aorta  and Alexis arch with a 26 Gelweave, and 2 bypass grafts: Left mammary to LAD and saphenous vein to RCA April 18, 2022    No symptoms of angina    Yearly cardiology eval in spring w Dr Manzano    She desires transition of cardiology - will get her set up with Dr Snider          Other Visit Diagnoses       MERLE (obstructive sleep apnea)        Relevant Medications    traZODone (Desyrel) 100 mg tablet    Personal history of nicotine dependence        Relevant Orders    CT lung screening low dose (Completed)    Breast cancer screening by mammogram        Relevant Orders    BI mammo bilateral screening tomosynthesis (Completed)    Routine general medical examination at health care facility

## 2024-02-13 ENCOUNTER — HOSPITAL ENCOUNTER (OUTPATIENT)
Dept: RADIOLOGY | Facility: CLINIC | Age: 69
Discharge: HOME | End: 2024-02-13
Payer: MEDICARE

## 2024-02-13 DIAGNOSIS — Z12.31 BREAST CANCER SCREENING BY MAMMOGRAM: ICD-10-CM

## 2024-02-13 DIAGNOSIS — Z87.891 PERSONAL HISTORY OF NICOTINE DEPENDENCE: ICD-10-CM

## 2024-02-13 PROCEDURE — 77067 SCR MAMMO BI INCL CAD: CPT

## 2024-02-13 PROCEDURE — 77063 BREAST TOMOSYNTHESIS BI: CPT | Performed by: STUDENT IN AN ORGANIZED HEALTH CARE EDUCATION/TRAINING PROGRAM

## 2024-02-13 PROCEDURE — 71271 CT THORAX LUNG CANCER SCR C-: CPT

## 2024-02-13 PROCEDURE — 77067 SCR MAMMO BI INCL CAD: CPT | Performed by: STUDENT IN AN ORGANIZED HEALTH CARE EDUCATION/TRAINING PROGRAM

## 2024-02-13 ASSESSMENT — ENCOUNTER SYMPTOMS
NECK PAIN: 0
ORTHOPNEA: 0
SHORTNESS OF BREATH: 0
SWEATS: 0
PALPITATIONS: 0
HYPERTENSION: 1
PND: 0
BLURRED VISION: 0
HEADACHES: 0

## 2024-02-20 ENCOUNTER — OFFICE VISIT (OUTPATIENT)
Dept: PRIMARY CARE | Facility: CLINIC | Age: 69
End: 2024-02-20
Payer: MEDICARE

## 2024-02-20 VITALS
RESPIRATION RATE: 16 BRPM | HEART RATE: 77 BPM | WEIGHT: 293 LBS | TEMPERATURE: 98 F | BODY MASS INDEX: 53.31 KG/M2 | SYSTOLIC BLOOD PRESSURE: 129 MMHG | DIASTOLIC BLOOD PRESSURE: 81 MMHG | OXYGEN SATURATION: 92 %

## 2024-02-20 DIAGNOSIS — G47.33 OBSTRUCTIVE SLEEP APNEA: ICD-10-CM

## 2024-02-20 DIAGNOSIS — E66.01 MORBID OBESITY WITH BODY MASS INDEX (BMI) OF 40.0 OR HIGHER (MULTI): ICD-10-CM

## 2024-02-20 DIAGNOSIS — I10 PRIMARY HYPERTENSION: Primary | ICD-10-CM

## 2024-02-20 PROBLEM — I89.0 LYMPHEDEMA OF BOTH LOWER EXTREMITIES: Status: ACTIVE | Noted: 2024-02-20

## 2024-02-20 PROCEDURE — 1160F RVW MEDS BY RX/DR IN RCRD: CPT | Performed by: FAMILY MEDICINE

## 2024-02-20 PROCEDURE — 1123F ACP DISCUSS/DSCN MKR DOCD: CPT | Performed by: FAMILY MEDICINE

## 2024-02-20 PROCEDURE — 1159F MED LIST DOCD IN RCRD: CPT | Performed by: FAMILY MEDICINE

## 2024-02-20 PROCEDURE — 1126F AMNT PAIN NOTED NONE PRSNT: CPT | Performed by: FAMILY MEDICINE

## 2024-02-20 PROCEDURE — 3079F DIAST BP 80-89 MM HG: CPT | Performed by: FAMILY MEDICINE

## 2024-02-20 PROCEDURE — 99213 OFFICE O/P EST LOW 20 MIN: CPT | Performed by: FAMILY MEDICINE

## 2024-02-20 PROCEDURE — 1036F TOBACCO NON-USER: CPT | Performed by: FAMILY MEDICINE

## 2024-02-20 PROCEDURE — 3074F SYST BP LT 130 MM HG: CPT | Performed by: FAMILY MEDICINE

## 2024-02-20 ASSESSMENT — ENCOUNTER SYMPTOMS
HEADACHES: 0
ORTHOPNEA: 0
SHORTNESS OF BREATH: 0
NECK PAIN: 0
BLURRED VISION: 0
PND: 0
SWEATS: 0
HYPERTENSION: 1
PALPITATIONS: 0

## 2024-02-20 NOTE — PROGRESS NOTES
Subjective   Patient ID: Arlene Siddiqui is a 68 y.o. female who presents for Weight Loss.    No longer taking the Semiglutin due to insurance,was going to be over $1000 for 30 days.     Would like to discuss what is next,.  Insurance will cover the Ozempic and Rybelsus.    Discuss if she should wear compression socks while she travels.     She was taking Rybelsus previously for 3 months with no side effects. She lost 12 pounds while taking it.   She was in a car for 9 hours near [facility] to go visit her son. She started feeling unwell and started seeing what she describes as three white dots on her left leg. She had seen Dr. Montoya for this on 11/29/2023. She is doing okay now but would like to know if wearing compression socks during extended periods of time with no activity would be beneficial.     Hypertension  This is a chronic problem. The current episode started more than 1 year ago. The problem has been waxing and waning since onset. The problem is resistant. Pertinent negatives include no anxiety, blurred vision, chest pain, headaches, malaise/fatigue, neck pain, orthopnea, palpitations, peripheral edema, PND, shortness of breath or sweats. There are no associated agents to hypertension. Risk factors for coronary artery disease include family history, obesity and post-menopausal state. There are no compliance problems.         Review of Systems   Constitutional:  Negative for malaise/fatigue.   Eyes:  Negative for blurred vision.   Respiratory:  Negative for shortness of breath.    Cardiovascular:  Negative for chest pain, palpitations, orthopnea and PND.   Musculoskeletal:  Negative for neck pain.   Neurological:  Negative for headaches.       Objective   /81 (BP Location: Right arm, Patient Position: Sitting, BP Cuff Size: Large adult)   Pulse 77   Temp 36.7 °C (98 °F) (Temporal)   Resp 16   Wt 139 kg (305 lb 12.5 oz)   LMP  (LMP Unknown)   SpO2 92%   BMI 53.31 kg/m²     Physical  Exam  Constitutional:       Appearance: Normal appearance. She is obese.   Neurological:      General: No focal deficit present.      Mental Status: She is alert.   Psychiatric:         Mood and Affect: Mood normal.         Behavior: Behavior normal.         Thought Content: Thought content normal.         Judgment: Judgment normal.         Assessment/Plan   Problem List Items Addressed This Visit       Obstructive sleep apnea     Intolerant of CPAP         Primary hypertension - Primary    Morbid obesity with body mass index (BMI) of 40.0 or higher (CMS/[facility])     Body mass index is 53.31 kg/m².     Prior on Rybelsus with successful weight loss, but insurance denied refill after January.    Referral sent to Debra, clinical pharmacist.  She will reach out and work with patient to determine best option for GLP-1 weight loss medication.      Ideal weight is BMI 25 or under.  Think of healthy lifestyle changes rather than a diet  Weight loss is 75% diet and 25% exercise   Think of daily plate that includes 50% vegetables and do not count peas, corn, white potatoes as a vegetable. 25% complex grains/starch, 25% protein/fat.  Ideal diet is predominately plant based - Vegetables and Fruit. Protein from non meat sources ideal (whole beans, chickpeas). If choosing meat source for protein - first choice is fish in omega-3 such as [place]. Next choice is skinless poultry and last choice and infrequent should be red meat.  Weight loss can be helped through mindful eating. There are apps that can be used to assist with keeping track of your food water and exercise, such as My fitness pal Can see nutritionist if preferred.   Losing 4-6 lbs a month is a sufficient means of gradually losing and maintaining weight loss (good healthy, long term weight loss).              Relevant Orders    Follow Up In [facility] - Pharmacy       Scribe Attestation  By signing my name below, IPamela , Amita   attest that this  documentation has been prepared under the direction and in the presence of Abilio Espinoza MD.

## 2024-02-20 NOTE — ASSESSMENT & PLAN NOTE
Body mass index is 53.31 kg/m².     Prior on Rybelsus with successful weight loss, but insurance denied refill after January.    Referral sent to Debra, clinical pharmacist.  She will reach out and work with patient to determine best option for GLP-1 weight loss medication.      Ideal weight is BMI 25 or under.  Think of healthy lifestyle changes rather than a diet  Weight loss is 75% diet and 25% exercise   Think of daily plate that includes 50% vegetables and do not count peas, corn, white potatoes as a vegetable. 25% complex grains/starch, 25% protein/fat.  Ideal diet is predominately plant based - Vegetables and Fruit. Protein from non meat sources ideal (whole beans, chickpeas). If choosing meat source for protein - first choice is fish in omega-3 such as [place]. Next choice is skinless poultry and last choice and infrequent should be red meat.  Weight loss can be helped through mindful eating. There are apps that can be used to assist with keeping track of your food water and exercise, such as My fitness pal Can see nutritionist if preferred.   Losing 4-6 lbs a month is a sufficient means of gradually losing and maintaining weight loss (good healthy, long term weight loss).

## 2024-02-20 NOTE — ASSESSMENT & PLAN NOTE
Prone to cellulitis - especially with prolonged inactivity, travel    Advised to wear compression stocking when travel > 2 hours  Stop and walk every 2-3 hours when travel

## 2024-03-05 ENCOUNTER — TELEMEDICINE (OUTPATIENT)
Dept: PHARMACY | Facility: HOSPITAL | Age: 69
End: 2024-03-05
Payer: MEDICARE

## 2024-03-05 DIAGNOSIS — E66.01 MORBID OBESITY WITH BODY MASS INDEX (BMI) OF 40.0 OR HIGHER (MULTI): ICD-10-CM

## 2024-03-05 NOTE — ASSESSMENT & PLAN NOTE
Patient has a BMI of 53.31 kg/m².  Patient was referred for initiation/ coverage assistance of a GLP-1 for weight loss.  Patient's insurance does not cover weight loss medications including Wegovy, Saxenda, and Zepbound.  However insurance told patient that they do cover Ozempic and Rybelsus with a prior authorization.      PLAN:   - Will complete PA for Ozempic to determine coverage.    - Continue diet and lifestyle changes to assist with weight loss.    Follow up: 2 weeks

## 2024-03-05 NOTE — PROGRESS NOTES
"Subjective   Patient ID: Kandi Siddiqui \"Arlene\" is a 68 y.o. female who presents for Obesity.    Referring Provider: Abilio Espinoza MD     HPI  Patient has joined a Silver Sneakers program to help with exercise.  She also states that her and her  have been eating and following a mediterranean diet.  Patient was previously on Rybelsus at the end of 2023 for 3 months.  While on the medication patient stated that she lost 12 lbs on the starting dose and only had a little nausea.   However she received a letter that her insurance will no longer be covering the medication and it will need a prior authorization.      Review of Systems    Medication System Management:  Affordability/Accessibility: Patient would like cost assistance with weight loss medication  Adherence/Organization: None  Adverse Effects: None    Objective     LMP  (LMP Unknown)      Labs  Lab Results   Component Value Date    BILITOT 0.5 01/23/2024    CALCIUM 9.3 01/23/2024    CO2 28 01/23/2024     01/23/2024    CREATININE 0.79 01/23/2024    GLUCOSE 117 (H) 01/23/2024    ALKPHOS 88 01/23/2024    K 4.1 01/23/2024    PROT 6.6 01/23/2024     01/23/2024    AST 17 01/23/2024    ALT 16 01/23/2024    BUN 15 01/23/2024    ANIONGAP 14 01/23/2024    MG 2.00 04/23/2022    PHOS 3.1 04/23/2022    ALBUMIN 4.0 01/23/2024    GFRF 72 04/21/2023     Lab Results   Component Value Date    TRIG 174 (H) 01/23/2024    CHOL 136 01/23/2024    LDLCALC 53 01/23/2024    HDL 48.4 01/23/2024     No results found for: \"HGBA1C\"    Current Outpatient Medications on File Prior to Visit   Medication Sig Dispense Refill    acetaminophen (Tylenol) 325 mg tablet Take 1 tablet (325 mg) by mouth every 6 hours if needed.      aspirin 81 mg EC tablet Take 1 tablet (81 mg) by mouth once daily.      ibuprofen 600 mg tablet Take 1 tablet (600 mg) by mouth every 6 hours.      metoprolol tartrate (Lopressor) 25 mg tablet Take 1 tablet (25 mg) by mouth 2 times a day. 180 tablet 3 "    multivitamin tablet Take 1 tablet by mouth once daily.      multivitamin-min-iron-FA-vit K (Multi For Her) 18 mg iron-600 mcg-40 mcg capsule Take by mouth.      rosuvastatin (Crestor) 40 mg tablet TAKE 1 TABLET BY MOUTH DAILY 100 tablet 1    semaglutide (Rybelsus) 3 mg tablet Take 1 tablet (3 mg) by mouth once daily.      traZODone (Desyrel) 100 mg tablet Take 1 tablet (100 mg) by mouth once daily at bedtime. 90 tablet 3     No current facility-administered medications on file prior to visit.        Assessment/Plan   Problem List Items Addressed This Visit       Morbid obesity with body mass index (BMI) of 40.0 or higher (CMS/[facility])     Patient has a BMI of 53.31 kg/m².  Patient was referred for initiation/ coverage assistance of a GLP-1 for weight loss.  Patient's insurance does not cover weight loss medications including Wegovy, Saxenda, and Zepbound.  However insurance told patient that they do cover Ozempic and Rybelsus with a prior authorization.      PLAN:   - Will complete PA for Ozempic to determine coverage.    - Continue diet and lifestyle changes to assist with weight loss.    Follow up: 2 weeks          Relevant Orders    Follow Up In [facility] - Pharmacy       Debra Hodges, PharmD    Continue all meds under the continuation of care with the referring provider and clinical pharmacy team.

## 2024-03-19 ENCOUNTER — TELEMEDICINE (OUTPATIENT)
Dept: PHARMACY | Facility: HOSPITAL | Age: 69
End: 2024-03-19
Payer: MEDICARE

## 2024-03-19 DIAGNOSIS — E66.01 MORBID OBESITY WITH BODY MASS INDEX (BMI) OF 40.0 OR HIGHER (MULTI): ICD-10-CM

## 2024-03-19 NOTE — ASSESSMENT & PLAN NOTE
Patient has a BMI of 53.31 kg/m².  Patient was referred for initiation/ coverage assistance of a GLP-1 for weight loss.  Patient's insurance does not cover weight loss medications including Wegovy, Saxenda, and Zepbound.  However insurance told patient that they do cover Ozempic and Rybelsus with a prior authorization. The PA was denied for Ozempic/ Rybelsus due to no diagnosis of Type 2 Diabetes.       PLAN:   - Will talk to PCP about next steps for weight loss  - Continue diet and lifestyle changes to assist with weight loss.    Follow up: As needed

## 2024-03-19 NOTE — PROGRESS NOTES
"Subjective   Patient ID: Kandi Siddiqui \"Arlene\" is a 69 y.o. female who presents for Obesity.    Referring Provider: Abilio Espinoza MD     HPI  Patient has joined a Silver Sneakers program to help with exercise.  She also states that her and her  have been eating and following a mediterranean diet.  Patient was previously on Rybelsus at the end of 2023 for 3 months.  While on the medication patient stated that she lost 12 lbs on the starting dose and only had a little nausea.   However she received a letter that her insurance will no longer be covering the medication and it will need a prior authorization.      Review of Systems    Medication System Management:  Affordability/Accessibility: Patient would like cost assistance with weight loss medication  Adherence/Organization: None  Adverse Effects: None    Objective     LMP  (LMP Unknown)      Labs  Lab Results   Component Value Date    BILITOT 0.5 01/23/2024    CALCIUM 9.3 01/23/2024    CO2 28 01/23/2024     01/23/2024    CREATININE 0.79 01/23/2024    GLUCOSE 117 (H) 01/23/2024    ALKPHOS 88 01/23/2024    K 4.1 01/23/2024    PROT 6.6 01/23/2024     01/23/2024    AST 17 01/23/2024    ALT 16 01/23/2024    BUN 15 01/23/2024    ANIONGAP 14 01/23/2024    MG 2.00 04/23/2022    PHOS 3.1 04/23/2022    ALBUMIN 4.0 01/23/2024    GFRF 72 04/21/2023     Lab Results   Component Value Date    TRIG 174 (H) 01/23/2024    CHOL 136 01/23/2024    LDLCALC 53 01/23/2024    HDL 48.4 01/23/2024     No results found for: \"HGBA1C\"    Current Outpatient Medications on File Prior to Visit   Medication Sig Dispense Refill    acetaminophen (Tylenol) 325 mg tablet Take 1 tablet (325 mg) by mouth every 6 hours if needed.      aspirin 81 mg EC tablet Take 1 tablet (81 mg) by mouth once daily.      ibuprofen 600 mg tablet Take 1 tablet (600 mg) by mouth every 6 hours.      metoprolol tartrate (Lopressor) 25 mg tablet Take 1 tablet (25 mg) by mouth 2 times a day. 180 tablet 3 "    multivitamin tablet Take 1 tablet by mouth once daily.      multivitamin-min-iron-FA-vit K (Multi For Her) 18 mg iron-600 mcg-40 mcg capsule Take by mouth.      rosuvastatin (Crestor) 40 mg tablet TAKE 1 TABLET BY MOUTH DAILY 100 tablet 1    semaglutide (Rybelsus) 3 mg tablet Take 1 tablet (3 mg) by mouth once daily.      traZODone (Desyrel) 100 mg tablet Take 1 tablet (100 mg) by mouth once daily at bedtime. 90 tablet 3     No current facility-administered medications on file prior to visit.        Assessment/Plan   Problem List Items Addressed This Visit       Morbid obesity with body mass index (BMI) of 40.0 or higher (CMS/[facility])     Patient has a BMI of 53.31 kg/m².  Patient was referred for initiation/ coverage assistance of a GLP-1 for weight loss.  Patient's insurance does not cover weight loss medications including Wegovy, Saxenda, and Zepbound.  However insurance told patient that they do cover Ozempic and Rybelsus with a prior authorization. The PA was denied for Ozempic/ Rybelsus due to no diagnosis of Type 2 Diabetes.       PLAN:   - Will talk to PCP about next steps for weight loss  - Continue diet and lifestyle changes to assist with weight loss.    Follow up: As needed              Debra Hodges, PharmD    Continue all meds under the continuation of care with the referring provider and clinical pharmacy team.

## 2024-03-25 PROBLEM — R91.8 MULTIPLE LUNG NODULES: Chronic | Status: ACTIVE | Noted: 2017-09-28

## 2024-04-01 ENCOUNTER — OFFICE VISIT (OUTPATIENT)
Dept: CARDIOLOGY | Facility: CLINIC | Age: 69
End: 2024-04-01
Payer: MEDICARE

## 2024-04-01 VITALS
HEART RATE: 88 BPM | DIASTOLIC BLOOD PRESSURE: 65 MMHG | WEIGHT: 293 LBS | BODY MASS INDEX: 51.78 KG/M2 | SYSTOLIC BLOOD PRESSURE: 154 MMHG | OXYGEN SATURATION: 95 %

## 2024-04-01 DIAGNOSIS — I25.10 CORONARY ARTERY DISEASE INVOLVING NATIVE CORONARY ARTERY OF NATIVE HEART WITHOUT ANGINA PECTORIS: ICD-10-CM

## 2024-04-01 DIAGNOSIS — Z95.4 STATUS POST COMBINED AORTIC ROOT AND VALVE REPLACEMENT USING STENTLESS BIOPROSTHETIC AORTIC VALVE: Primary | Chronic | ICD-10-CM

## 2024-04-01 DIAGNOSIS — I89.0 LYMPHEDEMA OF BOTH LOWER EXTREMITIES: ICD-10-CM

## 2024-04-01 DIAGNOSIS — I77.810 MILD DILATION OF ASCENDING AORTA (CMS-HCC): ICD-10-CM

## 2024-04-01 DIAGNOSIS — I10 PRIMARY HYPERTENSION: ICD-10-CM

## 2024-04-01 DIAGNOSIS — E78.2 MIXED HYPERLIPIDEMIA: Chronic | ICD-10-CM

## 2024-04-01 DIAGNOSIS — Z95.2 STATUS POST COMBINED AORTIC ROOT AND VALVE REPLACEMENT USING STENTLESS BIOPROSTHETIC AORTIC VALVE: Primary | Chronic | ICD-10-CM

## 2024-04-01 PROBLEM — H25.13 CATARACT, NUCLEAR SCLEROTIC, BOTH EYES: Status: RESOLVED | Noted: 2021-10-20 | Resolved: 2024-04-01

## 2024-04-01 PROBLEM — N95.1 MENOPAUSAL STATE: Status: RESOLVED | Noted: 2023-07-12 | Resolved: 2024-04-01

## 2024-04-01 PROBLEM — H02.88B MEIBOMIAN GLAND DYSFUNCTION (MGD) OF UPPER AND LOWER LIDS OF BOTH EYES: Status: RESOLVED | Noted: 2021-09-21 | Resolved: 2024-04-01

## 2024-04-01 PROBLEM — H52.13 MYOPIA OF BOTH EYES: Status: RESOLVED | Noted: 2021-10-20 | Resolved: 2024-04-01

## 2024-04-01 PROBLEM — H02.88A MEIBOMIAN GLAND DYSFUNCTION (MGD) OF UPPER AND LOWER LIDS OF BOTH EYES: Status: RESOLVED | Noted: 2021-09-21 | Resolved: 2024-04-01

## 2024-04-01 PROBLEM — H31.001 CHORIORETINAL SCAR OF RIGHT EYE: Status: RESOLVED | Noted: 2021-10-20 | Resolved: 2024-04-01

## 2024-04-01 PROBLEM — H52.4 PRESBYOPIA OF BOTH EYES: Status: RESOLVED | Noted: 2021-10-20 | Resolved: 2024-04-01

## 2024-04-01 PROBLEM — Z95.1 S/P CABG X 2: Status: RESOLVED | Noted: 2023-10-22 | Resolved: 2024-04-01

## 2024-04-01 PROBLEM — H16.223 KERATOCONJUNCTIVITIS SICCA OF BOTH EYES NOT SPECIFIED AS SJOGREN'S: Status: RESOLVED | Noted: 2021-09-21 | Resolved: 2024-04-01

## 2024-04-01 PROBLEM — H26.9 CORTICAL CATARACT OF BOTH EYES: Status: RESOLVED | Noted: 2021-10-20 | Resolved: 2024-04-01

## 2024-04-01 PROBLEM — H04.123 DRY EYE SYNDROME OF BOTH EYES: Status: RESOLVED | Noted: 2021-10-20 | Resolved: 2024-04-01

## 2024-04-01 PROCEDURE — 1036F TOBACCO NON-USER: CPT | Performed by: INTERNAL MEDICINE

## 2024-04-01 PROCEDURE — 1160F RVW MEDS BY RX/DR IN RCRD: CPT | Performed by: INTERNAL MEDICINE

## 2024-04-01 PROCEDURE — 1159F MED LIST DOCD IN RCRD: CPT | Performed by: INTERNAL MEDICINE

## 2024-04-01 PROCEDURE — 3077F SYST BP >= 140 MM HG: CPT | Performed by: INTERNAL MEDICINE

## 2024-04-01 PROCEDURE — 1123F ACP DISCUSS/DSCN MKR DOCD: CPT | Performed by: INTERNAL MEDICINE

## 2024-04-01 PROCEDURE — 3078F DIAST BP <80 MM HG: CPT | Performed by: INTERNAL MEDICINE

## 2024-04-01 PROCEDURE — 99215 OFFICE O/P EST HI 40 MIN: CPT | Performed by: INTERNAL MEDICINE

## 2024-04-01 PROCEDURE — 93005 ELECTROCARDIOGRAM TRACING: CPT | Performed by: INTERNAL MEDICINE

## 2024-04-01 PROCEDURE — 93010 ELECTROCARDIOGRAM REPORT: CPT | Performed by: INTERNAL MEDICINE

## 2024-04-01 NOTE — PATIENT INSTRUCTIONS
1.  Bicuspid aortic valve.  Status post aortic valve replacement with a stent was #25 freestyle valve April 18, 2022.  Doing very well.  She had an echo June 2023 in [place].  The valve is well-seated.    2.  Thoracic aortic aneurysm 4.8 cm.  Status post ascending aorta and hemiarch replacement with a #26 Gelweave graft April 18, 2022.  A repeat CT scan of the chest will be done March 2025.    3.  CAD.  Status post two-vessel bypass LIMA to the LAD SVG to the RCA April 18, 2022.  RCA known to be occluded in the past with left-to-right collaterals.  Previous myocardial infarction.  Continue risk factor modification.  She is taking baby aspirin, metoprolol tartrate 25 twice daily, and rosuvastatin.  We discussed the importance of daily exercise and getting into a swimming pool.  This will help with blood pressure control lipid control weight loss and decreasing inflammation.    4.  Tobacco abuse.  She stopped smoking in 2020.    5.  Hypertension.  For the most part blood pressures when she is not here are under good control typically in the 130 range.  Exercise and weight loss will help with that.    6.  Hyperlipidemia.  Followed by Dr. Espinoza.  1/23/2024 LDL 53 HDL 48 triglycerides 174.  Continue rosuvastatin.    I have reviewed all her imaging including catheterization echo images.  She will have an EKG today.  She will have a CAT scan with a BMP before that in March 2025.  My plan is to see her back in April 2025 unless she needs me sooner.

## 2024-04-01 NOTE — PROGRESS NOTES
Referred by No ref. provider found    HPI I am seeing Arlene to establish care with a new cardiologist.  Arlene is 69.  I used to take care of her mother several years ago.  She was followed on the [facility].  She was noted to have a bicuspid aortic valve and thoracic aortic aneurysm.  She was having ongoing symptoms of chest pain.  She ultimately had a heart catheterization at [facility] revealing a totally occluded right coronary artery which was collateralized from the left.  She knew this from a catheterization done in the past at the [facility].  She also had a 70% LAD stenosis.  She subsequently underwent open heart procedure with bypass x 2 vessels LIMA to the LAD and SVG to the RCA, aortic valve replacement with a #25 freestyle bioprosthetic valve, and aortic root and arch replacement with a #26 Gelweave graft.  This was done April 18, 2022.  She says she done quite well since then.  The jaw pain that she was having at nighttime resolved after her bypass surgery.    Past Medical History:  Problem List Items Addressed This Visit    None       Past Medical History:   Diagnosis Date    Aneurysm of the ascending aorta, without rupture (CMS/[facility]) 04/01/2022    Ascending aortic aneurysm    Arthritis     Atherosclerotic heart disease of native coronary artery with unspecified angina pectoris (CMS/[facility]) 07/12/2023    Bicuspid aortic valve 07/12/2023    Cataract     Cellulitis of unspecified part of limb 05/18/2022    Cellulitis, leg    Encounter for immunization 11/16/2020    Need for zoster vaccination    Encounter for immunization 11/25/2019    Encounter for immunization    Encounter for screening for malignant neoplasm of colon 11/16/2020    Screening for malignant neoplasm of colon    Encounter for screening for other viral diseases 10/30/2020    Need for hepatitis C screening test    Heart disease     Hypertension     Jaw pain 04/01/2022    Jaw pain    Lymphedema, not elsewhere classified 05/18/2022     Lymphedema of both lower extremities    Multiple lung nodules 09/28/2017    RUL 4 mm stable with CT 2015 / 2017 / 2020 / 2021  Annual screening due December 2022    Myalgia, other site 05/17/2021    Left buttock pain    Nonrheumatic aortic (valve) stenosis 07/12/2023    AVR  4/18/2022    Obstructive sleep apnea 08/30/2015    Untreated ... Unable to tolerate CPAP    Old myocardial infarction     History of myocardial infarction    Other complications following immunization, not elsewhere classified, initial encounter 03/08/2021    Local reaction to COVID-19 vaccine    Personal history of other diseases of the musculoskeletal system and connective tissue 11/25/2019    History of panniculitis    Personal history of other medical treatment 11/16/2020    History of screening mammography    Spontaneous ecchymoses 03/08/2021    Petechiae             Past Surgical History:  She has a past surgical history that includes Other surgical history (10/24/2019); Other surgical history (10/24/2019); Other surgical history (05/11/2022); Other surgical history (04/25/2022); Other surgical history (04/25/2022); Coronary artery bypass graft (04/18/2022); Cardiac surgery; and Cardiac valve replacement.      Social History:  She reports that she quit smoking about 4 years ago. Her smoking use included cigarettes. She started smoking about 47 years ago. She has a 60.00 pack-year smoking history. She has never used smokeless tobacco. She reports current alcohol use of about 7.0 standard drinks of alcohol per week. She reports that she does not use drugs.    Family History:  Family History   Problem Relation Name Age of Onset    Arthritis Mother Bhavna Choi     Hypertension Mother Bhavna Choi     Alcohol abuse Father Tyler choi     Hypertension Father Tyler choi     Heart disease Sister Bhakti     Heart attack Sister Bhakti     Alcohol abuse Sister Bhakti         Allergies:  Codeine and Neosporin ([id number])  "[neomycin-bacitracnzn-polymyxnb]    Outpatient Medications:  Current Outpatient Medications   Medication Instructions    acetaminophen (TYLENOL) 325 mg, oral, Every 6 hours PRN    aspirin 81 mg, oral, Daily    ibuprofen 600 mg, oral, Every 6 hours    metoprolol tartrate (LOPRESSOR) 25 mg, oral, 2 times daily    multivitamin tablet 1 tablet, oral, Daily RT    multivitamin-min-iron-FA-vit K (Multi For Her) 18 mg iron-600 mcg-40 mcg capsule oral    rosuvastatin (CRESTOR) 40 mg, oral, Daily    semaglutide (RYBELSUS) 3 mg, oral, Daily    traZODone (DESYREL) 100 mg, oral, Nightly        Last Recorded Vitals:  There were no vitals filed for this visit.    Physical Exam    Physical  Patient is alert and oriented x3.  HEENT is unremarkable mucous members are moist  Neck no JVP no bruits upstrokes are full no thyromegaly  Lungs are clear bilaterally.  No wheezing crackles or rales  Heart regular rhythm normal S1-S2 there is no S3 no murmurs are heard.  Abdomen is soft vessels are positive nontender nondistended no organomegaly no pulsatile masses  Extremities have no edema.  Distal pulses present palpable.  Neuro is grossly nonfocal  Skin has no rashes     Last Labs:  CBC -  Lab Results   Component Value Date    WBC 7.1 01/23/2024    HGB 14.5 01/23/2024    HCT 46.5 (H) 01/23/2024    MCV 98 01/23/2024     (L) 01/23/2024       CMP -  Lab Results   Component Value Date    CALCIUM 9.3 01/23/2024    PHOS 3.1 04/23/2022    PROT 6.6 01/23/2024    ALBUMIN 4.0 01/23/2024    AST 17 01/23/2024    ALT 16 01/23/2024    ALKPHOS 88 01/23/2024    BILITOT 0.5 01/23/2024       LIPID PANEL -   Lab Results   Component Value Date    CHOL 136 01/23/2024    HDL 48.4 01/23/2024    CHHDL 2.8 01/23/2024    VLDL 35 01/23/2024    TRIG 174 (H) 01/23/2024    NHDL 88 01/23/2024       RENAL FUNCTION PANEL -   Lab Results   Component Value Date    K 4.1 01/23/2024    PHOS 3.1 04/23/2022       No results found for: \"BNP\", \"HGBA1C\"  Procedures     Echo " 6/19/2023 EF 55 to 60%, well-positioned statin less freestyle #20 5 aortic valve.  Peak gradient 6 mmHg.  MAC mild to moderate pulmonary hypertension    Catheterization 3/29/2022 Annelise LAD 70% stenosis circumflex minimal, RCA totally occluded  Assessment/Plan   1.  Bicuspid aortic valve.  Status post aortic valve replacement with a stent was #25 freestyle valve April 18, 2022.  Doing very well.  She had an echo June 2023 in [place].  The valve is well-seated.    2.  Thoracic aortic aneurysm 4.8 cm.  Status post ascending aorta and hemiarch replacement with a #26 Gelweave graft April 18, 2022.  A repeat CT scan of the chest will be done March 2025.    3.  CAD.  Status post two-vessel bypass LIMA to the LAD SVG to the RCA April 18, 2022.  RCA known to be occluded in the past with left-to-right collaterals.  Previous myocardial infarction.  Continue risk factor modification.  She is taking baby aspirin, metoprolol tartrate 25 twice daily, and rosuvastatin.  We discussed the importance of daily exercise and getting into a swimming pool.  This will help with blood pressure control lipid control weight loss and decreasing inflammation.    4.  Tobacco abuse.  She stopped smoking in 2020.    5.  Hypertension.  For the most part blood pressures when she is not here are under good control typically in the 130 range.  Exercise and weight loss will help with that.    6.  Hyperlipidemia.  Followed by Dr. Espinoza.  1/23/2024 LDL 53 HDL 48 triglycerides 174.  Continue rosuvastatin.    I have reviewed all her imaging including catheterization echo images.  She will have an EKG today.  She will have a CAT scan with a BMP before that in March 2025.  My plan is to see her back in April 2025 unless she needs me sooner.    Sharita Snider MD     Instructions and follow up

## 2024-04-02 LAB
ATRIAL RATE: 77 BPM
P AXIS: 66 DEGREES
P OFFSET: 188 MS
P ONSET: 134 MS
PR INTERVAL: 162 MS
Q ONSET: 215 MS
QRS COUNT: 12 BEATS
QRS DURATION: 112 MS
QT INTERVAL: 388 MS
QTC CALCULATION(BAZETT): 439 MS
QTC FREDERICIA: 421 MS
R AXIS: 75 DEGREES
T AXIS: -34 DEGREES
T OFFSET: 409 MS
VENTRICULAR RATE: 77 BPM

## 2024-04-03 ENCOUNTER — OFFICE VISIT (OUTPATIENT)
Dept: DERMATOLOGY | Facility: CLINIC | Age: 69
End: 2024-04-03
Payer: MEDICARE

## 2024-04-03 DIAGNOSIS — M79.3 PANNICULITIS: Primary | ICD-10-CM

## 2024-04-03 PROCEDURE — 99214 OFFICE O/P EST MOD 30 MIN: CPT | Performed by: DERMATOLOGY

## 2024-04-03 PROCEDURE — 1123F ACP DISCUSS/DSCN MKR DOCD: CPT | Performed by: DERMATOLOGY

## 2024-04-03 PROCEDURE — 87205 SMEAR GRAM STAIN: CPT | Performed by: DERMATOLOGY

## 2024-04-03 PROCEDURE — 1159F MED LIST DOCD IN RCRD: CPT | Performed by: DERMATOLOGY

## 2024-04-03 PROCEDURE — 1160F RVW MEDS BY RX/DR IN RCRD: CPT | Performed by: DERMATOLOGY

## 2024-04-03 PROCEDURE — 1036F TOBACCO NON-USER: CPT | Performed by: DERMATOLOGY

## 2024-04-03 RX ORDER — CLOBETASOL PROPIONATE 0.5 MG/G
OINTMENT TOPICAL
Qty: 60 G | Refills: 0 | Status: SHIPPED | OUTPATIENT
Start: 2024-04-03 | End: 2024-05-14 | Stop reason: WASHOUT

## 2024-04-03 ASSESSMENT — DERMATOLOGY QUALITY OF LIFE (QOL) ASSESSMENT
RATE HOW BOTHERED YOU ARE BY SYMPTOMS OF YOUR SKIN PROBLEM (EG, ITCHING, STINGING BURNING, HURTING OR SKIN IRRITATION): 5
RATE HOW BOTHERED YOU ARE BY SYMPTOMS OF YOUR SKIN PROBLEM (EG, ITCHING, STINGING BURNING, HURTING OR SKIN IRRITATION): 5
WHAT SINGLE SKIN CONDITION LISTED BELOW IS THE PATIENT ANSWERING THE QUALITY-OF-LIFE ASSESSMENT QUESTIONS ABOUT: NONE OF THE ABOVE
RATE HOW BOTHERED YOU ARE BY EFFECTS OF YOUR SKIN PROBLEMS ON YOUR ACTIVITIES (EG, GOING OUT, ACCOMPLISHING WHAT YOU WANT, WORK ACTIVITIES OR YOUR RELATIONSHIPS WITH OTHERS): 0 - NEVER BOTHERED
RATE HOW EMOTIONALLY BOTHERED YOU ARE BY YOUR SKIN PROBLEM (FOR EXAMPLE, WORRY, EMBARRASSMENT, FRUSTRATION): 3
DATE THE QUALITY-OF-LIFE ASSESSMENT WAS COMPLETED: 66933
WHAT SINGLE SKIN CONDITION LISTED BELOW IS THE PATIENT ANSWERING THE QUALITY-OF-LIFE ASSESSMENT QUESTIONS ABOUT: NONE OF THE ABOVE
RATE HOW EMOTIONALLY BOTHERED YOU ARE BY YOUR SKIN PROBLEM (FOR EXAMPLE, WORRY, EMBARRASSMENT, FRUSTRATION): 3
RATE HOW BOTHERED YOU ARE BY EFFECTS OF YOUR SKIN PROBLEMS ON YOUR ACTIVITIES (EG, GOING OUT, ACCOMPLISHING WHAT YOU WANT, WORK ACTIVITIES OR YOUR RELATIONSHIPS WITH OTHERS): 0 - NEVER BOTHERED

## 2024-04-03 ASSESSMENT — PATIENT GLOBAL ASSESSMENT (PGA): WHAT IS THE PGA: PATIENT GLOBAL ASSESSMENT:  3 - MODERATE

## 2024-04-03 NOTE — PROGRESS NOTES
"Subjective     Kandi A Artino \"Arlene\" is a 69 y.o. female who presents for the following: Suspicious Skin Lesion (Pt reports to office for evaluation of lesions on right leg. States lesion has been there since Monday of this week./).     Review of Systems:  No other skin or systemic complaints other than what is documented elsewhere in the note.    The following portions of the chart were reviewed this encounter and updated as appropriate:   Tobacco  Allergies  Meds  Problems  Med Hx  Surg Hx  Fam Hx         Skin Cancer History  No skin cancer on file.      Specialty Problems    None       Objective   Well appearing patient in no apparent distress; mood and affect are within normal limits.    A focused skin examination was performed. All findings within normal limits unless otherwise noted below.    Assessment/Plan   1. Panniculitis (2)  Left Lower Leg - Anterior, Right Lower Leg - Anterior  Edematous papules overlying sites of prior panniculitis    Patient with history of lower extremity panniculitis; quiescent for years and off treatment (using emollients with Aveeno Eczema Therapy) who presents for re-evaluation today for a flare beginning a few days ago.  -Discussed that exam today is consistent with re activation of underlying panniculitis coupled with edema.  -Bacterial culture taken today to rule out infection  -Start clobetasol ointment twice daily to all areas of active inflammation.   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 Ruiz for years, with some improvement on beta dip and elidel, and no change on pentoxyfylline  -The condition was stable and asx for several years, and patient had heart surgery recently and has experienced a significant flare in her legs with swelling and return of redness and now weeping of seous fluid for over 1 month. Pt received a course of Keflex followed by a course of Bactrim with no improvement  -Exam previously has been consistent clinically with NLD with significant edema leading to breakdown of atrophic skin with weeping of serous fluid, and superimposed stasis dermatitis    Related Procedures  Tissue/Wound Culture/Smear    Related Medications  clobetasol (Temovate) 0.05 % ointment  Apply to affected areas on the lower legs twice daily when active        Follow up in 1 week  Discussed if there are any changes or development of concerning symptoms (lesion/skin condition is changing, bleeding, enlarging, or worsening) the patient is to contact my office. The patient verbalizes understanding.    Lena Medley MD  4/3/2024    Addendum: Clinical photographs sent by the patient reviewed, showing modest improvement in the edema. Culture final report showed rare acinetobacter, sensitive to bactrim, likely a contaminant but will treat with 10 days Bactrim DS to be cautious. Rx sent to patient's preferred pharmacy. Recommend continued topical steroids and leg elevation.    Lena Medley MD  04/08/24

## 2024-04-06 LAB
BACTERIA SPEC CULT: ABNORMAL
BACTERIA SPEC CULT: ABNORMAL
GRAM STN SPEC: ABNORMAL
GRAM STN SPEC: ABNORMAL

## 2024-04-08 RX ORDER — SULFAMETHOXAZOLE AND TRIMETHOPRIM 800; 160 MG/1; MG/1
TABLET ORAL
Qty: 20 TABLET | Refills: 0 | Status: SHIPPED | OUTPATIENT
Start: 2024-04-08 | End: 2024-05-14 | Stop reason: WASHOUT

## 2024-04-15 ENCOUNTER — OFFICE VISIT (OUTPATIENT)
Dept: DERMATOLOGY | Facility: CLINIC | Age: 69
End: 2024-04-15
Payer: MEDICARE

## 2024-04-15 DIAGNOSIS — M79.3 PANNICULITIS: Primary | ICD-10-CM

## 2024-04-15 PROCEDURE — 1160F RVW MEDS BY RX/DR IN RCRD: CPT | Performed by: DERMATOLOGY

## 2024-04-15 PROCEDURE — 1123F ACP DISCUSS/DSCN MKR DOCD: CPT | Performed by: DERMATOLOGY

## 2024-04-15 PROCEDURE — 1036F TOBACCO NON-USER: CPT | Performed by: DERMATOLOGY

## 2024-04-15 PROCEDURE — 1159F MED LIST DOCD IN RCRD: CPT | Performed by: DERMATOLOGY

## 2024-04-15 PROCEDURE — 99214 OFFICE O/P EST MOD 30 MIN: CPT | Performed by: DERMATOLOGY

## 2024-04-15 PROCEDURE — 11900 INJECT SKIN LESIONS </W 7: CPT | Performed by: DERMATOLOGY

## 2024-04-15 RX ORDER — TRIAMCINOLONE ACETONIDE 40 MG/ML
60 INJECTION, SUSPENSION INTRA-ARTICULAR; INTRAMUSCULAR ONCE
Status: COMPLETED | OUTPATIENT
Start: 2024-04-15 | End: 2024-04-15

## 2024-04-15 RX ADMIN — TRIAMCINOLONE ACETONIDE 60 MG: 40 INJECTION, SUSPENSION INTRA-ARTICULAR; INTRAMUSCULAR at 14:18

## 2024-04-15 NOTE — PROGRESS NOTES
"Lauryn Siddiqui \"Arlene\" is a 69 y.o. female who presents for the following: Procedure (Present for IMK injection.).     Review of Systems:  No other skin or systemic complaints other than what is documented elsewhere in the note.    The following portions of the chart were reviewed this encounter and updated as appropriate:   Tobacco  Allergies  Meds  Problems  Med Hx  Surg Hx  Fam Hx         Skin Cancer History  No skin cancer on file.      Specialty Problems    None       Objective   Well appearing patient in no apparent distress; mood and affect are within normal limits.    A focused skin examination was performed. All findings within normal limits unless otherwise noted below.    Assessment/Plan   1. Panniculitis (2)  Left Lower Leg - Anterior, Right Lower Leg - Anterior  Erythematous subcutaneous plaques on the R pretibial lower leg; L pretibial leg erythema and edema has resolved    Patient with history of lower extremity panniculitis; quiescent for years and off treatment (using emollients with Aveeno Eczema Therapy) who presents for re-evaluation today for a flare beginning last week  -Exam is consistent with re activation of underlying panniculitis coupled with edema.  -L pretibial leg erythema and edema has resolved  -Now R pretibial leg with erythema and edema  -Bacterial culture showed 1+ acinetobacter; patient completing course of bactrim but favor this to be contaminant  -Continue clobetasol ointment twice daily to all areas of active inflammation.   -Continue leg elevation to help with edema component  -IMK today given new onset erythema and inflammation to R pretibial leg to calm flare; this was very effective to calm the flare in 2022    -Potential side effects of steroids reviewed, including but not limited to: elevation in blood pressure, elevation in blood sugar/worsening of diabetes, risk of cataracts/glaucoma, weight gain, bone loss, joint disease/aseptic necrosis, " insomnia. There is a risk of atrophy at injection site.  -Discussed the need for GI prophylaxis if taking a longer course of systemic corticosteroids.  After discussion, patient wishes to proceed with intramuscular Kenalog injection today   -Patient verbalizes understanding of these potential risks and verbal consent was obtained from the patient to treat with intramuscular Kenalog.  -Intramuscular kenalog  40mg/ml x 1.5 ml was injected into the L buttock after prepping the skin with alcohol. The patient tolerated the procedure well with no complications.   Medication Lot No. 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 Ruiz for years, with some improvement on beta dip and elidel, and no change on pentoxyfylline  -The condition was stable and asx for several years, and patient had heart surgery recently and has experienced a significant flare in her legs with swelling and return of redness and now weeping of seous fluid for over 1 month. Pt received a course of Keflex followed by a course of Bactrim with no improvement  -Exam previously has been consistent clinically with NLD with significant edema leading to breakdown of atrophic skin with weeping of serous fluid, and superimposed stasis dermatitis    Related Medications  clobetasol (Temovate) 0.05 % ointment  Apply to affected areas on the lower legs twice daily when active    sulfamethoxazole-trimethoprim (Bactrim DS) 800-160 mg tablet  Take one pill by mouth twice daily.    triamcinolone acetonide (Kenalog-40) injection 60 mg        Patient to message me in 1 week with update  on progress    Follow up as needed  Discussed if there are any changes or development of concerning symptoms (lesion/skin condition is changing, bleeding, enlarging, or worsening) the patient is to contact my office. The patient verbalizes understanding.    Lena Medley MD  4/15/2024

## 2024-04-16 ENCOUNTER — LAB (OUTPATIENT)
Dept: LAB | Facility: LAB | Age: 69
End: 2024-04-16
Payer: MEDICARE

## 2024-04-16 DIAGNOSIS — D69.6 THROMBOCYTOPENIA (CMS-HCC): ICD-10-CM

## 2024-04-16 DIAGNOSIS — I77.810 MILD DILATION OF ASCENDING AORTA (CMS-HCC): ICD-10-CM

## 2024-04-16 DIAGNOSIS — Z95.4 STATUS POST COMBINED AORTIC ROOT AND VALVE REPLACEMENT USING STENTLESS BIOPROSTHETIC AORTIC VALVE: Chronic | ICD-10-CM

## 2024-04-16 DIAGNOSIS — Z95.2 STATUS POST COMBINED AORTIC ROOT AND VALVE REPLACEMENT USING STENTLESS BIOPROSTHETIC AORTIC VALVE: Chronic | ICD-10-CM

## 2024-04-16 LAB
BASOPHILS # BLD AUTO: 0.06 X10*3/UL (ref 0–0.1)
BASOPHILS NFR BLD AUTO: 0.7 %
EOSINOPHIL # BLD AUTO: 0.12 X10*3/UL (ref 0–0.7)
EOSINOPHIL NFR BLD AUTO: 1.5 %
ERYTHROCYTE [DISTWIDTH] IN BLOOD BY AUTOMATED COUNT: 13.2 % (ref 11.5–14.5)
HCT VFR BLD AUTO: 43.6 % (ref 36–46)
HGB BLD-MCNC: 14.3 G/DL (ref 12–16)
IMM GRANULOCYTES # BLD AUTO: 0.05 X10*3/UL (ref 0–0.7)
IMM GRANULOCYTES NFR BLD AUTO: 0.6 % (ref 0–0.9)
LYMPHOCYTES # BLD AUTO: 1.67 X10*3/UL (ref 1.2–4.8)
LYMPHOCYTES NFR BLD AUTO: 20.3 %
MCH RBC QN AUTO: 31.6 PG (ref 26–34)
MCHC RBC AUTO-ENTMCNC: 32.8 G/DL (ref 32–36)
MCV RBC AUTO: 97 FL (ref 80–100)
MONOCYTES # BLD AUTO: 0.7 X10*3/UL (ref 0.1–1)
MONOCYTES NFR BLD AUTO: 8.5 %
NEUTROPHILS # BLD AUTO: 5.64 X10*3/UL (ref 1.2–7.7)
NEUTROPHILS NFR BLD AUTO: 68.4 %
NRBC BLD-RTO: 0 /100 WBCS (ref 0–0)
PLATELET # BLD AUTO: 172 X10*3/UL (ref 150–450)
RBC # BLD AUTO: 4.52 X10*6/UL (ref 4–5.2)
WBC # BLD AUTO: 8.2 X10*3/UL (ref 4.4–11.3)

## 2024-04-16 PROCEDURE — 80053 COMPREHEN METABOLIC PANEL: CPT

## 2024-04-16 PROCEDURE — 36415 COLL VENOUS BLD VENIPUNCTURE: CPT

## 2024-04-16 PROCEDURE — 85025 COMPLETE CBC W/AUTO DIFF WBC: CPT

## 2024-04-17 LAB
ALBUMIN SERPL BCP-MCNC: 3.8 G/DL (ref 3.4–5)
ALP SERPL-CCNC: 95 U/L (ref 33–136)
ALT SERPL W P-5'-P-CCNC: 30 U/L (ref 7–45)
ANION GAP SERPL CALC-SCNC: 16 MMOL/L (ref 10–20)
AST SERPL W P-5'-P-CCNC: 34 U/L (ref 9–39)
BILIRUB SERPL-MCNC: 0.3 MG/DL (ref 0–1.2)
BUN SERPL-MCNC: 16 MG/DL (ref 6–23)
CALCIUM SERPL-MCNC: 9.3 MG/DL (ref 8.6–10.6)
CHLORIDE SERPL-SCNC: 105 MMOL/L (ref 98–107)
CO2 SERPL-SCNC: 25 MMOL/L (ref 21–32)
CREAT SERPL-MCNC: 1 MG/DL (ref 0.5–1.05)
EGFRCR SERPLBLD CKD-EPI 2021: 62 ML/MIN/1.73M*2
GLUCOSE SERPL-MCNC: 101 MG/DL (ref 74–99)
POTASSIUM SERPL-SCNC: 4.6 MMOL/L (ref 3.5–5.3)
PROT SERPL-MCNC: 6.8 G/DL (ref 6.4–8.2)
SODIUM SERPL-SCNC: 141 MMOL/L (ref 136–145)

## 2024-04-18 ENCOUNTER — APPOINTMENT (OUTPATIENT)
Dept: PRIMARY CARE | Facility: CLINIC | Age: 69
End: 2024-04-18
Payer: MEDICARE

## 2024-04-25 ENCOUNTER — OFFICE VISIT (OUTPATIENT)
Dept: HEMATOLOGY/ONCOLOGY | Facility: CLINIC | Age: 69
End: 2024-04-25
Payer: MEDICARE

## 2024-04-25 ENCOUNTER — EDUCATION (OUTPATIENT)
Dept: HEMATOLOGY/ONCOLOGY | Facility: CLINIC | Age: 69
End: 2024-04-25

## 2024-04-25 VITALS
WEIGHT: 293 LBS | BODY MASS INDEX: 54 KG/M2 | HEART RATE: 85 BPM | DIASTOLIC BLOOD PRESSURE: 55 MMHG | SYSTOLIC BLOOD PRESSURE: 130 MMHG | OXYGEN SATURATION: 94 % | RESPIRATION RATE: 16 BRPM | TEMPERATURE: 98.4 F

## 2024-04-25 DIAGNOSIS — D69.6 THROMBOCYTOPENIA (CMS-HCC): ICD-10-CM

## 2024-04-25 PROCEDURE — 99213 OFFICE O/P EST LOW 20 MIN: CPT | Performed by: INTERNAL MEDICINE

## 2024-04-25 PROCEDURE — 1159F MED LIST DOCD IN RCRD: CPT | Performed by: INTERNAL MEDICINE

## 2024-04-25 PROCEDURE — 3078F DIAST BP <80 MM HG: CPT | Performed by: INTERNAL MEDICINE

## 2024-04-25 PROCEDURE — 1126F AMNT PAIN NOTED NONE PRSNT: CPT | Performed by: INTERNAL MEDICINE

## 2024-04-25 PROCEDURE — 3075F SYST BP GE 130 - 139MM HG: CPT | Performed by: INTERNAL MEDICINE

## 2024-04-25 PROCEDURE — 1160F RVW MEDS BY RX/DR IN RCRD: CPT | Performed by: INTERNAL MEDICINE

## 2024-04-25 PROCEDURE — 1123F ACP DISCUSS/DSCN MKR DOCD: CPT | Performed by: INTERNAL MEDICINE

## 2024-04-25 ASSESSMENT — PAIN SCALES - GENERAL: PAINLEVEL: 0-NO PAIN

## 2024-04-25 NOTE — PROGRESS NOTES
TED RICARDO is a 68 year old Female        Interval History:    The patient was referred to me by Dr. Espinoza for further evaluation and management of thrombocytopenia. The patient presents as a new patient on 09/29/2022.      The patient is a 67 year old with a past medical history significant for thrombocytopenia, anemia, multiple cardiac conditions, HLD, COPD,  MERLE, pulmonary nodules and a vitamin D deficiency. The patient recently underwent a CABG x3 and an aortic valve  replacement on 04/18/2022. At the time of surgery, the patient had a PLT level of 156 but since then the patient's PLT levels have dropped and she was subsequently referred to me for further evaluation and management.      At interview on 09/29/2022 the patient narrated her past medical history. The patient denies any history of fevers, night sweats, unexplained weight loss, shortness of breath, chest pain, constipation, confusion, nausea, vomiting, diarrhea, hemoptysis,  hematemesis, hematuria and hematochezia. No history of localized or systemic bleeding and infection.      The patient had come for a follow up on April 25, 2024 regarding history of thrombocytopenia. The patient presents to discus the results of the labs ordered.She denies any new symptoms at this time.      Past Medical History:   1. Thrombocytopenia   2. Anemia  3. Multiple cardiac conditions  - CAD s/p CABG x3 on 04/18/2022   - Aortic stenosis s/p aortic valve replacement on 04/18/2022   - Mitral and tricuspid valve stenosis   - HTN   4. HLD  5. COPD  6. MERLE  7. Pulmonary nodules   8. Vitamin D deficiency  9. Morbid obesity      Past Surgical History:   1. CABG x 3 and aortic valve replacement on 04/18/2022   2. Cardiac cath with stent placement      Family Medical History:   1. Father with HTN and alcoholism   2. Mother with HTN   3. Sister with CAD w/ MI and HTN      Last mammogram was a few years ago.    Last colonoscopy was a few years ago.         Review of  Systems:   ·  System Review All Systems:  Negative            Allergies and Intolerances:       Allergies:         codeine: Drug, Psychosis, Active     Outpatient Medication Profile:  * Patient Currently Takes Medications as of 28-Apr-2023 10:17 documented in Structured Notes         metoprolol tartrate 25 mg oral tablet : Last Dose Taken:  , 1 tab(s) orally 2 times a day, Start Date: 22-Apr-2022         acetaminophen 325 mg oral tablet: Last Dose Taken:  , 2 tab(s) orally  every 6 hours, As needed , Start Date: 22-Apr-2022         aspirin 81 mg oral tablet: Last Dose Taken:  , 1 tab(s) orally once a  day         traZODone 100 mg oral tablet: Last Dose Taken:  , 1 tab(s) orally once  a day         rosuvastatin 40 mg oral tablet: Last Dose Taken:  , 1 tab(s) orally once  a day         Multiple Vitamins oral capsule: Last Dose Taken:  , 1 cap(s) orally once  a day             Medical History:         Thrombocytopenia: ICD-10: D69.6, Status: Active         Status post left heart catheterization (LHC): ICD-10: Z98.890,  Status: Active        Social History:   Social Substance History:  ·  Smoking Status former smoker   ·  Tobacco Use history of abuse   ·  Alcohol Use daily   ·  Drug Use history of abuse   ·  Additional History     67 years old    with 1 daughter   Smoked 1.5 packs a day for 40 years and quit 2 years ago   Drinks 6 alcoholic drinks a week on average  (1)           Vitals and Measurements:   Vitals: Temp: 36.1  HR: 83  RR: 18  BP: 181/78  SPO2%:   94   Measurements: HT(cm): 161.3  WT(kg): 135.7  BSA:  2.46  BMI:  52.1      Physical Exam:      Constitutional: Well developed, awake/alert/oriented  x3, no distress, alert and cooperative   Eyes: PERRL, EOMI, clear sclera   ENMT: mucous membranes moist, no apparent injury,  no lesions seen   Head/Neck: Neck supple, no apparent injury, thyroid  without mass or tenderness, No JVD, trachea midline, no bruits   Respiratory/Thorax: Patent airways, CTAB,  normal  breath sounds with good chest expansion, thorax symmetric   Cardiovascular: Regular, rate and rhythm, no murmurs,  2+ equal pulses of the extremities, normal S 1and S 2   Gastrointestinal: Nondistended, soft, non-tender,  no rebound tenderness or guarding, no masses palpable, no organomegaly, +BS, no bruits   Genitourinary: No Discharge, vesicles or other abnormalities   Musculoskeletal: ROM intact, no joint swelling, normal  strength   Extremities: normal extremities, no cyanosis edema,  contusions or wounds, no clubbing   Neurological: alert and oriented x3, intact senses,  motor, response and reflexes, normal strength   Breast: No masses, tenderness, no discharge or discoloration   Lymphatic: No significant lymphadenopathy   Psychological: Appropriate mood and behavior   Skin: Warm and dry, no lesions, no rashes               Assessment and Plan:   Assessment:    1. Thrombocytopenia      The patient is a 67 year old with a past medical history significant for thrombocytopenia, anemia, multiple cardiac conditions, HLD, COPD,  MERLE, pulmonary nodules and a vitamin D deficiency. The patient recently underwent a CABG x3 and an aortic valve  replacement on 04/18/2022. At the time of surgery, the patient had a PLT level of 156 but since then the patient's PLT levels have dropped and she was subsequently referred to me for further evaluation and management.      Physical exam was within normal limits. I reviewed the lab data with the patient. CBC obtained on 09/01/2022 revealed WBC 5.3, HGB 14.8, HCT 47.4, PLT low at 78 and neut 3.35. Repeat CBC obtained today on 09/29/2022 revealed WBC 7.1, HGB 14.7, PLT now  improved up to 113, Neut 4.44. I had a detailed discussion with the patient and explained to the patient the role of PLT's in assisting with the coagulation and clotting process. I explained the significance or low PLT counts.  Differential diagnosis  for thrombocytopenia includes decreased production,  increased destruction, infection, inflammation, drug induction, deficiency state, autoimmune, platelet clumping vs others. PLT has normalized at this time. No therapeutic recommendations. I recommended  that the patient be reassessed in 4 weeks to see if PLT counts have held. If PLT continues to improve to WNL, then she will RTC PRN. If PLT is reassessed and decreases then we will proceed with further evaluation.      The patient had come for a follow up on October 26, 2023. Physical exam was within normal limits. I reviewed the lab data with the patient.  Platelet count 121,000/mm³.  The patient denies history of recurrent localized or systemic bleeding.  Possibility  of myelodysplastic syndrome does exist.  1 could consider a bone marrow aspiration and biopsy but since the patient is asymptomatic there would be no treatment the patient declined.  Hepatitis B antibody is negative.     The patient had come for follow-up on April 25, 2024 regarding history of thrombocytopenia.  The patient was extensively evaluated in the past no obvious etiology established.  Clinical follow-up was recommended.  A CBC on April 16, 2024 revealed platelets 172,000/mm³.  The patient is pleased follow closely with Dr. Alexis gibson as needed.        2. Anemia  - Resolved at this time. HGB is WNL at a level of 14.7 g/dl.      3. Multiple cardiac conditions  - CAD s/p CABG x3 on 04/18/2022- Continue to take Aspirin 81 mg PO QD.   - Aortic stenosis s/p aortic valve replacement on 04/18/2022   - Mitral and tricuspid valve stenosis   - HTN- Continue to take Metoprolol tartrate 25 mg PO BID      4. HLD  - Rosuvastatin 40 mg PO QD      5. COPD     6. MERLE     7. Pulmonary nodules     8. Vitamin D deficiency     9. Morbid obesity

## 2024-04-25 NOTE — PROGRESS NOTES
"Patient seen by Dr. Hernandez today in clinic. Reinforcement education provided regarding next steps with plan of care.      PER DR. HERNANDEZ'S FUV NOTE TODAY: \"The patient had come for follow-up on April 25, 2024 regarding history of thrombocytopenia. The patient was extensively evaluated in the past no obvious etiology established. Clinical follow-up was recommended. A CBC on April 16, 2024 revealed platelets 172,000/mm³. The patient is pleased follow closely with Dr. Espinoza return as needed. \"    Reinforced pt will follow up with Dr. Espinoza/PCP and will contact us if any future further needs. Patient verbalizes understanding of plan of care via teachback method.             "

## 2024-04-26 ENCOUNTER — APPOINTMENT (OUTPATIENT)
Dept: HEMATOLOGY/ONCOLOGY | Facility: CLINIC | Age: 69
End: 2024-04-26
Payer: MEDICARE

## 2024-05-14 ENCOUNTER — HOSPITAL ENCOUNTER (OUTPATIENT)
Dept: RADIOLOGY | Facility: CLINIC | Age: 69
Discharge: HOME | End: 2024-05-14
Payer: MEDICARE

## 2024-05-14 ENCOUNTER — OFFICE VISIT (OUTPATIENT)
Dept: OBSTETRICS AND GYNECOLOGY | Facility: CLINIC | Age: 69
End: 2024-05-14
Payer: MEDICARE

## 2024-05-14 VITALS — BODY MASS INDEX: 50.02 KG/M2 | HEIGHT: 64 IN | WEIGHT: 293 LBS

## 2024-05-14 DIAGNOSIS — N95.0 PMB (POSTMENOPAUSAL BLEEDING): ICD-10-CM

## 2024-05-14 PROCEDURE — 99204 OFFICE O/P NEW MOD 45 MIN: CPT | Performed by: OBSTETRICS & GYNECOLOGY

## 2024-05-14 PROCEDURE — 1036F TOBACCO NON-USER: CPT | Performed by: OBSTETRICS & GYNECOLOGY

## 2024-05-14 PROCEDURE — 1123F ACP DISCUSS/DSCN MKR DOCD: CPT | Performed by: OBSTETRICS & GYNECOLOGY

## 2024-05-14 PROCEDURE — 1159F MED LIST DOCD IN RCRD: CPT | Performed by: OBSTETRICS & GYNECOLOGY

## 2024-05-14 PROCEDURE — 76830 TRANSVAGINAL US NON-OB: CPT | Performed by: OBSTETRICS & GYNECOLOGY

## 2024-05-14 PROCEDURE — 87624 HPV HI-RISK TYP POOLED RSLT: CPT

## 2024-05-14 PROCEDURE — 88142 CYTOPATH C/V THIN LAYER: CPT

## 2024-05-14 PROCEDURE — 1160F RVW MEDS BY RX/DR IN RCRD: CPT | Performed by: OBSTETRICS & GYNECOLOGY

## 2024-05-14 NOTE — PROGRESS NOTES
Chief Complaint   Patient presents with    Abnormal Uterine Bleeding     New Patient - PMB    C/o  bright red bleeding x 4 days no cramping  - nothing since then         69-year-old retired G1, P1 whose last Pap was 8 to 12 years ago.  Has been well until 3 weeks ago when she experienced 4 days of light bright red vaginal bleeding.  Bleeding has resolved.  No pain or discomfort since.    Today we discussed evaluation of postmenopausal bleeding including clinical exam, pelvic ultrasound, endometrial biopsy.  Given that it has been quite sometime since a Pap we will obtain this.    REVIEW OF SYSTEMS    Please see HPI for reported pertinent positives, which would supersede this ROS    Constitutional:  Denies fever, chills, wt gain or loss, fatigue    Genito-Urinary:  Denies genital lesion or sores, vaginal dryness, itching  or pain.  No abnormal vaginal discharge or unexplained vaginal bleeding.  No dysuria, urinary incontinence or frequency.  Denies pelvic pain, dysmenorrhea or dyspareunia.    Eyes:  Denies vision changes, dryness  ENT:  No hearing loss, sinus pain or congestion, nosebleeds  Cardiovascular:  No chest pain or palpitations  Respiratory:  No SOB, cough, wheezing  GI:  No Nausea, vomiting, diarrhea, constipation, abdominal pain  Musculoskeletal:  No new back pain. joint pain, peripheral edema  Skin:  No rash or skin lesion  Neurologic:  No HA, numbness or dizziness  Psychiatric:  No new anxiety or depression  Endocrine:  No loss of hair or hirsutism  Hematologic/lymphatic:  No swollen lymph nodes.  No reported tendency for easy bruising or bleeding    Patient admits to no other systemic complaints      PHYSICAL EXAM:    PSYCH:  Pt is alert, oriented and cooperative    SKIN: warm, dry, w/o lesion    HEAD AND FACE:  External inspection of eyes, ears, functional cranial nerves normal and intact    THYROID:  No thyromegaly    CARDIOVASCULAR:  Warm and well Perfused    PULMONARY:  No respiratory  distress    ABDOMEN:  soft, nontender.  No mass or organomegaly.      BREAST:  Breasts are symmetric to inspection and palpation.  No mass palpable bilaterally.  There is no axillary lymphadenopathy    PELVIC:    External genitalia, urethra, perianal region normal to inspection and palpation if indicated.  No inguinal LA    Vagina without lesions.    Cervix seen and visually normal  High in vault    Bimanual exam:      No pelvic mass palpable    Uterus nontender, midline in pelvis    No adnexal masses or tenderness      Assessment/Plan    Diagnoses and all orders for this visit:  PMB (postmenopausal bleeding) -again, discussed evaluation of the postmenopausal bleeding.  Clinical exam unremarkable.  Pap obtained today.    Ultrasound shows a 7 mm endometrium with an anterior calcified fibroid.    Given these findings, will arrange follow-up endometrial biopsy.  -     US pelvis; Future  -     THINPREP PAP TEST

## 2024-05-21 ENCOUNTER — PROCEDURE VISIT (OUTPATIENT)
Dept: OBSTETRICS AND GYNECOLOGY | Facility: CLINIC | Age: 69
End: 2024-05-21
Payer: MEDICARE

## 2024-05-21 DIAGNOSIS — N95.0 PMB (POSTMENOPAUSAL BLEEDING): ICD-10-CM

## 2024-05-21 PROCEDURE — 88305 TISSUE EXAM BY PATHOLOGIST: CPT

## 2024-05-21 PROCEDURE — 88305 TISSUE EXAM BY PATHOLOGIST: CPT | Performed by: PATHOLOGY

## 2024-05-21 PROCEDURE — 58100 BIOPSY OF UTERUS LINING: CPT | Performed by: OBSTETRICS & GYNECOLOGY

## 2024-05-21 NOTE — PROGRESS NOTES
"Patient ID: Kandi Siddiqui \"Arlene\" is a 69 y.o. female.    Endometrial biopsy    Date/Time: 5/21/2024 12:46 PM    Performed by: Shahzad Hernandez MD  Authorized by: Shahzad Hernandez MD    Consent:     Consent obtained: verbal    Consent given by: patient    Risks discussed: bleeding, infection, need for repeat procedure and pain  Indications:     Indications: postmenopausal bleeding    Pre-procedure:     Urine pregnancy test: N/A    Procedure:     Prepped with: Betadine    Tenaculum used: yes      Cervix dilated: no      Number of passes: 2  Findings:     Cervix: normal      Uterus depth by sound (cm): 8    Specimen collected: specimen collected and sent to pathology    Comments:     Procedure comments: 1st specimen mostly mucous - second pass low volume c/w endometrial tissue    "

## 2024-05-23 LAB
LABORATORY COMMENT REPORT: NORMAL
PATH REPORT.FINAL DX SPEC: NORMAL
PATH REPORT.GROSS SPEC: NORMAL
PATH REPORT.RELEVANT HX SPEC: NORMAL
PATH REPORT.TOTAL CANCER: NORMAL

## 2024-05-24 LAB
CYTOLOGY CMNT CVX/VAG CYTO-IMP: NORMAL
HPV HR 12 DNA GENITAL QL NAA+PROBE: NEGATIVE
HPV HR GENOTYPES PNL CVX NAA+PROBE: NEGATIVE
HPV16 DNA SPEC QL NAA+PROBE: NEGATIVE
HPV18 DNA SPEC QL NAA+PROBE: NEGATIVE
LAB AP HPV GENOTYPE QUESTION: YES
LAB AP HPV HR: NORMAL
LABORATORY COMMENT REPORT: NORMAL
LABORATORY COMMENT REPORT: NORMAL
PATH REPORT.TOTAL CANCER: NORMAL

## 2024-06-29 DIAGNOSIS — E78.5 HYPERLIPIDEMIA, UNSPECIFIED HYPERLIPIDEMIA TYPE: ICD-10-CM

## 2024-07-01 ENCOUNTER — APPOINTMENT (OUTPATIENT)
Dept: DERMATOLOGY | Facility: CLINIC | Age: 69
End: 2024-07-01
Payer: MEDICARE

## 2024-07-01 RX ORDER — ROSUVASTATIN CALCIUM 40 MG/1
40 TABLET, COATED ORAL DAILY
Qty: 100 TABLET | Refills: 2 | OUTPATIENT
Start: 2024-07-01

## 2024-07-19 DIAGNOSIS — I10 PRIMARY HYPERTENSION: ICD-10-CM

## 2024-07-19 DIAGNOSIS — E78.5 HYPERLIPIDEMIA, UNSPECIFIED HYPERLIPIDEMIA TYPE: ICD-10-CM

## 2024-07-20 RX ORDER — ROSUVASTATIN CALCIUM 40 MG/1
40 TABLET, COATED ORAL DAILY
Qty: 100 TABLET | Refills: 2 | Status: SHIPPED | OUTPATIENT
Start: 2024-07-20

## 2024-07-20 RX ORDER — METOPROLOL TARTRATE 25 MG/1
25 TABLET, FILM COATED ORAL 2 TIMES DAILY
Qty: 180 TABLET | Refills: 3 | Status: SHIPPED | OUTPATIENT
Start: 2024-07-20

## 2024-07-23 ASSESSMENT — ENCOUNTER SYMPTOMS
HEADACHES: 0
HYPERTENSION: 1
NECK PAIN: 0
PALPITATIONS: 0
ORTHOPNEA: 0
BLURRED VISION: 0

## 2024-07-30 ENCOUNTER — APPOINTMENT (OUTPATIENT)
Dept: PRIMARY CARE | Facility: CLINIC | Age: 69
End: 2024-07-30
Payer: MEDICARE

## 2024-07-30 ENCOUNTER — LAB (OUTPATIENT)
Dept: LAB | Facility: LAB | Age: 69
End: 2024-07-30
Payer: MEDICARE

## 2024-07-30 VITALS
TEMPERATURE: 97.8 F | DIASTOLIC BLOOD PRESSURE: 69 MMHG | HEART RATE: 73 BPM | OXYGEN SATURATION: 93 % | BODY MASS INDEX: 53.35 KG/M2 | SYSTOLIC BLOOD PRESSURE: 143 MMHG | WEIGHT: 293 LBS

## 2024-07-30 DIAGNOSIS — F51.04 CHRONIC INSOMNIA: ICD-10-CM

## 2024-07-30 DIAGNOSIS — R73.09 ELEVATED GLUCOSE: ICD-10-CM

## 2024-07-30 DIAGNOSIS — Z95.4 STATUS POST COMBINED AORTIC ROOT AND VALVE REPLACEMENT USING STENTLESS BIOPROSTHETIC AORTIC VALVE: Chronic | ICD-10-CM

## 2024-07-30 DIAGNOSIS — R91.8 MULTIPLE LUNG NODULES: Chronic | ICD-10-CM

## 2024-07-30 DIAGNOSIS — I77.810 MILD DILATION OF ASCENDING AORTA (CMS-HCC): ICD-10-CM

## 2024-07-30 DIAGNOSIS — Z95.2 STATUS POST COMBINED AORTIC ROOT AND VALVE REPLACEMENT USING STENTLESS BIOPROSTHETIC AORTIC VALVE: Chronic | ICD-10-CM

## 2024-07-30 DIAGNOSIS — D12.2 ADENOMATOUS POLYP OF ASCENDING COLON: ICD-10-CM

## 2024-07-30 DIAGNOSIS — I10 PRIMARY HYPERTENSION: ICD-10-CM

## 2024-07-30 DIAGNOSIS — R09.89 DECREASED PEDAL PULSES: ICD-10-CM

## 2024-07-30 DIAGNOSIS — E66.01 MORBID OBESITY WITH BODY MASS INDEX (BMI) OF 40.0 OR HIGHER (MULTI): ICD-10-CM

## 2024-07-30 DIAGNOSIS — R73.03 PREDIABETES: ICD-10-CM

## 2024-07-30 DIAGNOSIS — Z87.891 FORMER CIGARETTE SMOKER: ICD-10-CM

## 2024-07-30 DIAGNOSIS — I25.10 CORONARY ARTERY DISEASE INVOLVING NATIVE CORONARY ARTERY OF NATIVE HEART WITHOUT ANGINA PECTORIS: ICD-10-CM

## 2024-07-30 DIAGNOSIS — E78.2 MIXED HYPERLIPIDEMIA: Primary | Chronic | ICD-10-CM

## 2024-07-30 DIAGNOSIS — E55.9 VITAMIN D DEFICIENCY: ICD-10-CM

## 2024-07-30 PROBLEM — D69.6 THROMBOCYTOPENIA (CMS-HCC): Status: RESOLVED | Noted: 2023-07-12 | Resolved: 2024-07-30

## 2024-07-30 PROCEDURE — 1160F RVW MEDS BY RX/DR IN RCRD: CPT | Performed by: FAMILY MEDICINE

## 2024-07-30 PROCEDURE — 3078F DIAST BP <80 MM HG: CPT | Performed by: FAMILY MEDICINE

## 2024-07-30 PROCEDURE — 3077F SYST BP >= 140 MM HG: CPT | Performed by: FAMILY MEDICINE

## 2024-07-30 PROCEDURE — 99214 OFFICE O/P EST MOD 30 MIN: CPT | Performed by: FAMILY MEDICINE

## 2024-07-30 PROCEDURE — 1159F MED LIST DOCD IN RCRD: CPT | Performed by: FAMILY MEDICINE

## 2024-07-30 PROCEDURE — 1123F ACP DISCUSS/DSCN MKR DOCD: CPT | Performed by: FAMILY MEDICINE

## 2024-07-30 PROCEDURE — 36415 COLL VENOUS BLD VENIPUNCTURE: CPT

## 2024-07-30 PROCEDURE — 83036 HEMOGLOBIN GLYCOSYLATED A1C: CPT

## 2024-07-30 RX ORDER — NITROGLYCERIN 0.4 MG/1
0.4 TABLET SUBLINGUAL EVERY 5 MIN PRN
Qty: 15 TABLET | Refills: 0 | Status: SHIPPED | OUTPATIENT
Start: 2024-07-30

## 2024-07-30 ASSESSMENT — PATIENT HEALTH QUESTIONNAIRE - PHQ9
2. FEELING DOWN, DEPRESSED OR HOPELESS: NOT AT ALL
SUM OF ALL RESPONSES TO PHQ9 QUESTIONS 1 AND 2: 0
1. LITTLE INTEREST OR PLEASURE IN DOING THINGS: NOT AT ALL

## 2024-07-30 ASSESSMENT — ENCOUNTER SYMPTOMS
DYSURIA: 0
FREQUENCY: 0
CONSTIPATION: 0
HYPERTENSION: 1
HEADACHES: 0
DIARRHEA: 0
ABDOMINAL PAIN: 0
NECK PAIN: 0
BLURRED VISION: 0
PALPITATIONS: 0
NAUSEA: 0
ORTHOPNEA: 0

## 2024-07-30 NOTE — ASSESSMENT & PLAN NOTE
Lab Results   Component Value Date    CHOL 136 01/23/2024    CHOL 125 04/21/2023    CHOL 131 12/13/2021     Lab Results   Component Value Date    HDL 48.4 01/23/2024    HDL 48.2 04/21/2023    HDL 42.8 12/13/2021     Lab Results   Component Value Date    LDLCALC 53 01/23/2024     Lab Results   Component Value Date    TRIG 174 (H) 01/23/2024    TRIG 138 04/21/2023    TRIG 182 (H) 12/13/2021   Stable.  Continue rosuvastatin for secondary cardiovascular prevention.

## 2024-07-30 NOTE — ASSESSMENT & PLAN NOTE
40 year history, quit in 2020.    CT 2/2024  1.  Few small bilateral noncalcified pulmonary nodules measuring up  to 5 mm, likely benign. Continued screening with low-dose noncontrast  chest CT in 12 months (from current date) is recommended.

## 2024-07-30 NOTE — ASSESSMENT & PLAN NOTE
Annual low dose CT for lung cancer screening  2/2024 CT  1.  Few small bilateral noncalcified pulmonary nodules measuring up  to 5 mm, likely benign. Continued screening with low-dose noncontrast  chest CT in 12 months (from current date) is recommended.

## 2024-07-30 NOTE — ASSESSMENT & PLAN NOTE
Body mass index is 53.35 kg/m².   Ideal weight is BMI 25 or under.  Think of healthy lifestyle changes rather than a diet  Weight loss is 75% diet and 25% exercise   Think of daily plate that includes 50% vegetables and do not count peas, corn, white potatoes as a vegetable. 25% complex grains/starch, 25% protein/fat.  Ideal diet is predominately plant based - Vegetables and Fruit. Protein from non meat sources ideal (whole beans, chickpeas). If choosing meat source for protein - first choice is fish in omega-3 such as [place]. Next choice is skinless poultry and last choice and infrequent should be red meat.  Weight loss can be helped through mindful eating. There are apps that can be used to assist with keeping track of your food water and exercise, such as My fitness pal Can see nutritionist if preferred.   Losing 4-6 lbs a month is a sufficient means of gradually losing and maintaining weight loss (good healthy, long term weight loss).

## 2024-07-30 NOTE — ASSESSMENT & PLAN NOTE
Blood pressure in office:  BP Readings from Last 1 Encounters:   04/25/24 130/55   The goal range for blood pressure is below 130/80.   We will continue to monitor.   Continue metoprolol 25 mg BID.

## 2024-07-30 NOTE — PROGRESS NOTES
Answers submitted by the patient for this visit:  High Blood Pressure Questionnaire (Submitted on 7/23/2024)  Chief Complaint: Hypertension  Chronicity: recurrent  Onset: more than 1 year ago  Progression since onset: unchanged  Condition status: controlled  anxiety: No  blurred vision: No  chest pain: No  headaches: No  malaise/fatigue: No  neck pain: No  orthopnea: No  palpitations: No  peripheral edema: Yes  CAD risks: family history, obesity, post-menopausal state  Compliance problems: no compliance problems

## 2024-07-30 NOTE — ASSESSMENT & PLAN NOTE
Per Dr Snider note 4/2024:  Bicuspid aortic valve. Status post aortic valve replacement with a stent was #25 freestyle valve April 18, 2022. Doing very well. She had an echo June 2023 in [place]. The valve is well-seated.

## 2024-07-30 NOTE — PROGRESS NOTES
Subjective   Patient ID: Arlene Siddiqui is a 69 y.o. female who presents for Hypertension and Hyperlipidemia.    Her legs are bothering her especially at night. They are painful when they rub together. She was prescribes an eczema cream to try to apply. She does not have any pain in her feet or legs.     Hypertension  This is a recurrent problem. The current episode started more than 1 year ago. The problem is unchanged. The problem is controlled. Associated symptoms include peripheral edema. Pertinent negatives include no anxiety, blurred vision, chest pain, headaches, malaise/fatigue, neck pain, orthopnea or palpitations. Risk factors for coronary artery disease include family history, obesity and post-menopausal state. There are no compliance problems.         Review of Systems   Constitutional:  Negative for malaise/fatigue.   Eyes:  Negative for blurred vision.   Cardiovascular:  Negative for chest pain, palpitations and orthopnea.   Gastrointestinal:  Negative for abdominal pain, constipation, diarrhea and nausea.   Genitourinary:  Negative for dysuria, frequency and urgency.   Musculoskeletal:  Negative for neck pain.   Neurological:  Negative for headaches.       Objective   /69   Pulse 73   Temp 36.6 °C (97.8 °F)   Wt 141 kg (310 lb 12.8 oz)   LMP  (LMP Unknown)   SpO2 93%   BMI 53.35 kg/m²     Physical Exam  Constitutional:       Appearance: Normal appearance. She is obese.   HENT:      Head: Normocephalic.      Right Ear: Tympanic membrane normal.      Left Ear: Tympanic membrane normal.      Nose: Nose normal.      Mouth/Throat:      Pharynx: Oropharynx is clear.   Eyes:      Conjunctiva/sclera: Conjunctivae normal.   Cardiovascular:      Rate and Rhythm: Normal rate and regular rhythm.      Pulses: Normal pulses.      Heart sounds: Normal heart sounds.   Pulmonary:      Effort: Pulmonary effort is normal.      Breath sounds: Normal breath sounds.   Abdominal:      General: Abdomen is flat.  Bowel sounds are normal.      Palpations: Abdomen is soft.   Musculoskeletal:      Cervical back: Normal range of motion.   Neurological:      General: No focal deficit present.      Mental Status: She is alert.   Psychiatric:         Mood and Affect: Mood normal.         Behavior: Behavior normal.         Thought Content: Thought content normal.         Judgment: Judgment normal.         Assessment/Plan   Problem List Items Addressed This Visit       Multiple lung nodules (Chronic)     Annual low dose CT for lung cancer screening  2/2024 CT  1.  Few small bilateral noncalcified pulmonary nodules measuring up  to 5 mm, likely benign. Continued screening with low-dose noncontrast  chest CT in 12 months (from current date) is recommended.         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 Status post aortic valve replacement with a stent was #25 freestyle valve April 18, 2022. Doing very well. She had an echo June 2023 in [place]. The valve is well-seated.          Vitamin D deficiency     1/23/2024 - 58  stable         Primary hypertension     Blood pressure in office:  BP Readings from Last 1 Encounters:   04/25/24 130/55   The goal range for blood pressure is below 130/80.   We will continue to monitor.   Continue metoprolol 25 mg BID.         Morbid obesity with body mass index (BMI) of 40.0 or higher (Multi)     Body mass index is 53.35 kg/m².   Ideal weight is BMI 25 or under.  Think of healthy lifestyle changes rather than a diet  Weight loss is 75% diet and 25% exercise   Think of daily plate that includes 50% vegetables and do not count peas, corn, white potatoes as a vegetable. 25% complex grains/starch, 25% protein/fat.  Ideal diet is predominately plant based - Vegetables and Fruit. Protein from non meat sources ideal (whole beans, chickpeas). If choosing meat source for protein - first  choice is fish in omega-3 such as [place]. Next choice is skinless poultry and last choice and infrequent should be red meat.  Weight loss can be helped through mindful eating. There are apps that can be used to assist with keeping track of your food water and exercise, such as My fitness pal Can see nutritionist if preferred.   Losing 4-6 lbs a month is a sufficient means of gradually losing and maintaining weight loss (good healthy, long term weight loss).          Chronic insomnia     Stable on trazodone         Hyperlipidemia, unspecified - Primary (Chronic)     Lab Results   Component Value Date    CHOL 136 01/23/2024    CHOL 125 04/21/2023    CHOL 131 12/13/2021     Lab Results   Component Value Date    HDL 48.4 01/23/2024    HDL 48.2 04/21/2023    HDL 42.8 12/13/2021     Lab Results   Component Value Date    LDLCALC 53 01/23/2024     Lab Results   Component Value Date    TRIG 174 (H) 01/23/2024    TRIG 138 04/21/2023    TRIG 182 (H) 12/13/2021   Stable.  Continue rosuvastatin for secondary cardiovascular prevention.         Adenomatous polyp of ascending colon     Colonoscopy 2021  Due 2026         CAD (coronary artery disease)     No symptoms of angina  cardiology eval in 4/2024  w Dr Snider         Former cigarette smoker     40 year history, quit in 2020.    CT 2/2024  1.  Few small bilateral noncalcified pulmonary nodules measuring up  to 5 mm, likely benign. Continued screening with low-dose noncontrast  chest CT in 12 months (from current date) is recommended.          Follow up: Schedule in 6 months    Scribe Attestation  By signing my name below, I, Amita Shoemaker   attest that this documentation has been prepared under the direction and in the presence of Abilio Espinoza MD.

## 2024-07-31 PROBLEM — R73.03 PREDIABETES: Status: ACTIVE | Noted: 2024-07-31

## 2024-07-31 LAB
EST. AVERAGE GLUCOSE BLD GHB EST-MCNC: 120 MG/DL
HBA1C MFR BLD: 5.8 %

## 2024-08-01 NOTE — ASSESSMENT & PLAN NOTE
A1c 5.8%  Stable status   Continue to restrict sugars, carbs, and strive for being active/getting steps

## 2024-08-05 ENCOUNTER — HOSPITAL ENCOUNTER (OUTPATIENT)
Dept: VASCULAR MEDICINE | Facility: CLINIC | Age: 69
Discharge: HOME | End: 2024-08-05
Payer: MEDICARE

## 2024-08-05 DIAGNOSIS — R09.89 DECREASED PEDAL PULSES: ICD-10-CM

## 2024-08-05 DIAGNOSIS — I73.9 PERIPHERAL VASCULAR DISEASE, UNSPECIFIED (CMS-HCC): ICD-10-CM

## 2024-08-05 PROCEDURE — 93922 UPR/L XTREMITY ART 2 LEVELS: CPT | Performed by: STUDENT IN AN ORGANIZED HEALTH CARE EDUCATION/TRAINING PROGRAM

## 2024-08-05 PROCEDURE — 93922 UPR/L XTREMITY ART 2 LEVELS: CPT

## 2024-08-15 ASSESSMENT — DERMATOLOGY QUALITY OF LIFE (QOL) ASSESSMENT
WHAT SINGLE SKIN CONDITION LISTED BELOW IS THE PATIENT ANSWERING THE QUALITY-OF-LIFE ASSESSMENT QUESTIONS ABOUT: NONE OF THE ABOVE
RATE HOW EMOTIONALLY BOTHERED YOU ARE BY YOUR SKIN PROBLEM (FOR EXAMPLE, WORRY, EMBARRASSMENT, FRUSTRATION): 6 - ALWAYS BOTHERED
RATE HOW EMOTIONALLY BOTHERED YOU ARE BY YOUR SKIN PROBLEM (FOR EXAMPLE, WORRY, EMBARRASSMENT, FRUSTRATION): 6 - ALWAYS BOTHERED
RATE HOW BOTHERED YOU ARE BY EFFECTS OF YOUR SKIN PROBLEMS ON YOUR ACTIVITIES (EG, GOING OUT, ACCOMPLISHING WHAT YOU WANT, WORK ACTIVITIES OR YOUR RELATIONSHIPS WITH OTHERS): 6 - ALWAYS BOTHERED
WHAT SINGLE SKIN CONDITION LISTED BELOW IS THE PATIENT ANSWERING THE QUALITY-OF-LIFE ASSESSMENT QUESTIONS ABOUT: NONE OF THE ABOVE
RATE HOW BOTHERED YOU ARE BY SYMPTOMS OF YOUR SKIN PROBLEM (EG, ITCHING, STINGING BURNING, HURTING OR SKIN IRRITATION): 6 - ALWAYS BOTHERED
RATE HOW BOTHERED YOU ARE BY EFFECTS OF YOUR SKIN PROBLEMS ON YOUR ACTIVITIES (EG, GOING OUT, ACCOMPLISHING WHAT YOU WANT, WORK ACTIVITIES OR YOUR RELATIONSHIPS WITH OTHERS): 6 - ALWAYS BOTHERED
RATE HOW BOTHERED YOU ARE BY SYMPTOMS OF YOUR SKIN PROBLEM (EG, ITCHING, STINGING BURNING, HURTING OR SKIN IRRITATION): 6 - ALWAYS BOTHERED

## 2024-08-19 ENCOUNTER — APPOINTMENT (OUTPATIENT)
Dept: DERMATOLOGY | Facility: CLINIC | Age: 69
End: 2024-08-19
Payer: MEDICARE

## 2024-12-06 DIAGNOSIS — G47.33 OSA (OBSTRUCTIVE SLEEP APNEA): ICD-10-CM

## 2024-12-07 RX ORDER — TRAZODONE HYDROCHLORIDE 100 MG/1
100 TABLET ORAL NIGHTLY
Qty: 90 TABLET | Refills: 3 | Status: SHIPPED | OUTPATIENT
Start: 2024-12-07

## 2024-12-31 ASSESSMENT — DERMATOLOGY QUALITY OF LIFE (QOL) ASSESSMENT
RATE HOW BOTHERED YOU ARE BY SYMPTOMS OF YOUR SKIN PROBLEM (EG, ITCHING, STINGING BURNING, HURTING OR SKIN IRRITATION): 5
RATE HOW BOTHERED YOU ARE BY EFFECTS OF YOUR SKIN PROBLEMS ON YOUR ACTIVITIES (EG, GOING OUT, ACCOMPLISHING WHAT YOU WANT, WORK ACTIVITIES OR YOUR RELATIONSHIPS WITH OTHERS): 2
RATE HOW EMOTIONALLY BOTHERED YOU ARE BY YOUR SKIN PROBLEM (FOR EXAMPLE, WORRY, EMBARRASSMENT, FRUSTRATION): 5
RATE HOW BOTHERED YOU ARE BY EFFECTS OF YOUR SKIN PROBLEMS ON YOUR ACTIVITIES (EG, GOING OUT, ACCOMPLISHING WHAT YOU WANT, WORK ACTIVITIES OR YOUR RELATIONSHIPS WITH OTHERS): 2
WHAT SINGLE SKIN CONDITION LISTED BELOW IS THE PATIENT ANSWERING THE QUALITY-OF-LIFE ASSESSMENT QUESTIONS ABOUT: PSORIASIS
RATE HOW BOTHERED YOU ARE BY SYMPTOMS OF YOUR SKIN PROBLEM (EG, ITCHING, STINGING BURNING, HURTING OR SKIN IRRITATION): 5
WHAT SINGLE SKIN CONDITION LISTED BELOW IS THE PATIENT ANSWERING THE QUALITY-OF-LIFE ASSESSMENT QUESTIONS ABOUT: PSORIASIS
RATE HOW EMOTIONALLY BOTHERED YOU ARE BY YOUR SKIN PROBLEM (FOR EXAMPLE, WORRY, EMBARRASSMENT, FRUSTRATION): 5
DATE THE QUALITY-OF-LIFE ASSESSMENT WAS COMPLETED: 67204

## 2025-01-07 ENCOUNTER — APPOINTMENT (OUTPATIENT)
Dept: DERMATOLOGY | Facility: CLINIC | Age: 70
End: 2025-01-07
Payer: MEDICARE

## 2025-01-07 DIAGNOSIS — M79.3 PANNICULITIS: Primary | ICD-10-CM

## 2025-01-07 PROCEDURE — 1036F TOBACCO NON-USER: CPT | Performed by: DERMATOLOGY

## 2025-01-07 PROCEDURE — 1160F RVW MEDS BY RX/DR IN RCRD: CPT | Performed by: DERMATOLOGY

## 2025-01-07 PROCEDURE — 1159F MED LIST DOCD IN RCRD: CPT | Performed by: DERMATOLOGY

## 2025-01-07 PROCEDURE — 1123F ACP DISCUSS/DSCN MKR DOCD: CPT | Performed by: DERMATOLOGY

## 2025-01-07 PROCEDURE — 99214 OFFICE O/P EST MOD 30 MIN: CPT | Performed by: DERMATOLOGY

## 2025-01-07 RX ORDER — CLOBETASOL PROPIONATE 0.5 MG/G
OINTMENT TOPICAL
Qty: 120 G | Refills: 1 | Status: SHIPPED | OUTPATIENT
Start: 2025-01-07

## 2025-01-07 RX ORDER — TRIAMCINOLONE ACETONIDE 40 MG/ML
80 INJECTION, SUSPENSION INTRA-ARTICULAR; INTRAMUSCULAR ONCE
Status: COMPLETED | OUTPATIENT
Start: 2025-01-07 | End: 2025-01-07

## 2025-01-07 RX ORDER — TRIAMCINOLONE ACETONIDE 1 MG/G
OINTMENT TOPICAL
Qty: 453.6 G | Refills: 1 | Status: SHIPPED | OUTPATIENT
Start: 2025-01-07

## 2025-01-07 RX ADMIN — TRIAMCINOLONE ACETONIDE 80 MG: 40 INJECTION, SUSPENSION INTRA-ARTICULAR; INTRAMUSCULAR at 15:08

## 2025-01-07 NOTE — PROGRESS NOTES
"Subjective     Kandi A Artino \"Arlene\" is a 69 y.o. female who presents for the following: paniculitis (Bilateral lower legs. Clobetasol ointment used by pt. History of IMK. Pt states flaring more often.).     Review of Systems:  No other skin or systemic complaints other than what is documented elsewhere in the note.    The following portions of the chart were reviewed this encounter and updated as appropriate:   Tobacco  Allergies  Meds  Problems  Med Hx  Surg Hx  Fam Hx                Objective   Well appearing patient in no apparent distress; mood and affect are within normal limits.    A focused skin examination was performed. All findings within normal limits unless otherwise noted below.    Assessment/Plan   1. Panniculitis (2)  Left Lower Leg - Anterior, Right Lower Leg - Anterior  Deeply erythematous subcutaneous plaques with edema on the bilateral pretibial legs and now on the L posterior lower leg    Patient with history of lower extremity panniculitis; was previously quiescent for years and off treatment but now with recent flares  -Given extent of involvement today, will repeat IMK (last given in April 2024)  -Restart topical steroids; clobetasol or triamcinolone ointment twice daily every day until follow up visit in 4 weeks time, will then consider taper and addition of TCI  -Likely start ILK to active areas monthly starting at next visit  -Continue emollients with Aveeno Eczema Therapy  -Continue leg elevation to help with edema component  -IMK today given new onset erythema and inflammation to R pretibial leg to calm flare; this was very effective to calm the flare in 2022    -Potential side effects of steroids reviewed, including but not limited to: elevation in blood pressure, elevation in blood sugar/worsening of diabetes, risk of cataracts/glaucoma, weight gain, bone loss, joint disease/aseptic necrosis, insomnia. There is a risk of atrophy at injection site.  -Discussed the need for GI " prophylaxis if taking a longer course of systemic corticosteroids.  After discussion, patient wishes to proceed with intramuscular Kenalog injection today   -Patient verbalizes understanding of these potential risks and verbal consent was obtained from the patient to treat with intramuscular Kenalog.  -Intramuscular kenalog  40 mg/ml x 2 ml was injected into the R buttock after prepping the skin with alcohol. The patient tolerated the procedure well with no complications.   Medication Lot No. 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 Ruiz for years, with some improvement on beta dip and elidel, and no change on pentoxyfylline  -The condition was stable and asx for several years, and patient had heart surgery recently and has experienced a significant flare in her legs with swelling and return of redness and now weeping of seous fluid for over 1 month. Pt received a course of Keflex followed by a course of Bactrim with no improvement  -Exam previously has been consistent clinically with NLD with significant edema leading to breakdown of atrophic skin with weeping of serous fluid, and superimposed stasis dermatitis    Related Procedures  Follow Up In Dermatology - Established Patient    Related Medications  triamcinolone (Kenalog) 0.1 % ointment  Apply to affected areas on the lower leg twice daily when active as needed.    clobetasol (Temovate) 0.05 % ointment  Apply to affected areas on the lower legs twice daily when active as needed.    triamcinolone acetonide (Kenalog-40) injection 80 mg          Follow up in 1 month for  panniculitis   Discussed if there are any changes or development of concerning symptoms (lesion/skin condition is changing, bleeding, enlarging, or worsening) the patient is to contact my office. The patient verbalizes understanding.    Lena Medley MD  1/7/2025

## 2025-01-30 ENCOUNTER — APPOINTMENT (OUTPATIENT)
Dept: PRIMARY CARE | Facility: CLINIC | Age: 70
End: 2025-01-30
Payer: MEDICARE

## 2025-01-30 VITALS
RESPIRATION RATE: 14 BRPM | BODY MASS INDEX: 52.18 KG/M2 | DIASTOLIC BLOOD PRESSURE: 83 MMHG | HEART RATE: 73 BPM | OXYGEN SATURATION: 94 % | TEMPERATURE: 96.9 F | SYSTOLIC BLOOD PRESSURE: 138 MMHG | WEIGHT: 293 LBS

## 2025-01-30 DIAGNOSIS — Z87.891 FORMER CIGARETTE SMOKER: ICD-10-CM

## 2025-01-30 DIAGNOSIS — Z95.2 STATUS POST COMBINED AORTIC ROOT AND VALVE REPLACEMENT USING STENTLESS BIOPROSTHETIC AORTIC VALVE: Chronic | ICD-10-CM

## 2025-01-30 DIAGNOSIS — I10 PRIMARY HYPERTENSION: ICD-10-CM

## 2025-01-30 DIAGNOSIS — R69 TRAVEL-RELATED ILLNESS: ICD-10-CM

## 2025-01-30 DIAGNOSIS — R73.09 ELEVATED GLUCOSE: ICD-10-CM

## 2025-01-30 DIAGNOSIS — E78.00 ELEVATED LDL CHOLESTEROL LEVEL: Primary | ICD-10-CM

## 2025-01-30 DIAGNOSIS — E78.2 MIXED HYPERLIPIDEMIA: Chronic | ICD-10-CM

## 2025-01-30 DIAGNOSIS — G47.33 OBSTRUCTIVE SLEEP APNEA: Chronic | ICD-10-CM

## 2025-01-30 DIAGNOSIS — Z95.4 STATUS POST COMBINED AORTIC ROOT AND VALVE REPLACEMENT USING STENTLESS BIOPROSTHETIC AORTIC VALVE: Chronic | ICD-10-CM

## 2025-01-30 DIAGNOSIS — E55.9 VITAMIN D DEFICIENCY: ICD-10-CM

## 2025-01-30 DIAGNOSIS — F51.04 CHRONIC INSOMNIA: ICD-10-CM

## 2025-01-30 DIAGNOSIS — R91.8 MULTIPLE LUNG NODULES: Chronic | ICD-10-CM

## 2025-01-30 DIAGNOSIS — I25.10 CORONARY ARTERY DISEASE INVOLVING NATIVE CORONARY ARTERY OF NATIVE HEART WITHOUT ANGINA PECTORIS: ICD-10-CM

## 2025-01-30 DIAGNOSIS — D12.2 ADENOMATOUS POLYP OF ASCENDING COLON: ICD-10-CM

## 2025-01-30 DIAGNOSIS — Z12.31 BREAST CANCER SCREENING BY MAMMOGRAM: ICD-10-CM

## 2025-01-30 DIAGNOSIS — R73.03 PREDIABETES: ICD-10-CM

## 2025-01-30 DIAGNOSIS — E66.01 MORBID OBESITY WITH BODY MASS INDEX (BMI) OF 40.0 OR HIGHER (MULTI): ICD-10-CM

## 2025-01-30 PROCEDURE — 1159F MED LIST DOCD IN RCRD: CPT | Performed by: FAMILY MEDICINE

## 2025-01-30 PROCEDURE — 99214 OFFICE O/P EST MOD 30 MIN: CPT | Performed by: FAMILY MEDICINE

## 2025-01-30 PROCEDURE — 1160F RVW MEDS BY RX/DR IN RCRD: CPT | Performed by: FAMILY MEDICINE

## 2025-01-30 PROCEDURE — 3079F DIAST BP 80-89 MM HG: CPT | Performed by: FAMILY MEDICINE

## 2025-01-30 PROCEDURE — 3075F SYST BP GE 130 - 139MM HG: CPT | Performed by: FAMILY MEDICINE

## 2025-01-30 PROCEDURE — 1123F ACP DISCUSS/DSCN MKR DOCD: CPT | Performed by: FAMILY MEDICINE

## 2025-01-30 PROCEDURE — 1036F TOBACCO NON-USER: CPT | Performed by: FAMILY MEDICINE

## 2025-01-30 RX ORDER — SCOPOLAMINE 1 MG/3D
1 PATCH, EXTENDED RELEASE TRANSDERMAL
Qty: 10 PATCH | Refills: 0 | Status: SHIPPED | OUTPATIENT
Start: 2025-01-30

## 2025-01-30 ASSESSMENT — ENCOUNTER SYMPTOMS
RESPIRATORY NEGATIVE: 1
GASTROINTESTINAL NEGATIVE: 1
CARDIOVASCULAR NEGATIVE: 1
NEUROLOGICAL NEGATIVE: 1
PSYCHIATRIC NEGATIVE: 1
MUSCULOSKELETAL NEGATIVE: 1

## 2025-01-30 NOTE — ASSESSMENT & PLAN NOTE
Lab Results   Component Value Date    HGBA1C 5.8 (H) 07/30/2024   Stable status   Continue to restrict sugars, carbs, and strive for being active/getting steps

## 2025-01-30 NOTE — ASSESSMENT & PLAN NOTE
replacement of aortic root with 25 freestyle, ascending aorta and Alexis arch with a 26 Gelweave, and 2 bypass grafts: Left mammary to LAD and saphenous vein to RCA April 18, 2022    No symptoms of angina  cardiology eval in 4/2024  w Dr Snider

## 2025-01-30 NOTE — ASSESSMENT & PLAN NOTE
Annual low dose CT for lung cancer screening  2/2024 CT  1.  Few small bilateral noncalcified pulmonary nodules measuring up  to 5 mm, likely benign. Continued screening with low-dose noncontrast  chest CT in 12 months (from current date) is recommended.    She has 3/2025 CTA chest w and wo  contrast per Dr Snider

## 2025-01-30 NOTE — ASSESSMENT & PLAN NOTE
40 year history, quit in 2020.  Candidate for annual low dose screening until 2035 (15 yr from quit date)    CT 2/2024  1.  Few small bilateral noncalcified pulmonary nodules measuring up  to 5 mm, likely benign. Continued screening with low-dose noncontrast  chest CT in 12 months (from current date) is recommended.

## 2025-01-30 NOTE — ASSESSMENT & PLAN NOTE
Last 3 BMI readings:  BMI Readings from Last 3 Encounters:   01/30/25 52.18 kg/m²   07/30/24 53.35 kg/m²   05/14/24 52.35 kg/m²   Down 6 lbs in 6 months (pt reported).  Due to insurance change, may be eligible for Ozempic - will refer to [facility] to discuss.     Ideal weight is BMI 25 or under.  Think of healthy lifestyle changes rather than a diet  Weight loss is 75% diet and 25% exercise   Think of daily plate that includes 50% vegetables and do not count peas, corn, white potatoes as a vegetable. 25% complex grains/starch, 25% protein/fat.  Ideal diet is predominately plant based - Vegetables and Fruit. Protein from non meat sources ideal (whole beans, chickpeas). If choosing meat source for protein - first choice is fish in omega-3 such as [place]. Next choice is skinless poultry and last choice and infrequent should be red meat.  Weight loss can be helped through mindful eating. There are apps that can be used to assist with keeping track of your food water and exercise, such as My fitness pal Can see nutritionist if preferred.   Losing 4-6 lbs a month is a sufficient means of gradually losing and maintaining weight loss (good healthy, long term weight loss).

## 2025-01-30 NOTE — ASSESSMENT & PLAN NOTE
Per Dr Snider note 4/2024:  Bicuspid aortic valve. Status post aortic valve replacement with a stent was #25 freestyle valve April 18, 2022. Doing very well. She had an echo June 2023 in [place]. The valve is well-seated.

## 2025-01-30 NOTE — ASSESSMENT & PLAN NOTE
Blood pressure in office:  BP Readings from Last 1 Encounters:   01/30/25 138/83   The goal range for blood pressure is below 130/80.   We will continue to monitor.   Continue metoprolol 25 mg BID.

## 2025-02-08 ASSESSMENT — DERMATOLOGY QUALITY OF LIFE (QOL) ASSESSMENT
RATE HOW EMOTIONALLY BOTHERED YOU ARE BY YOUR SKIN PROBLEM (FOR EXAMPLE, WORRY, EMBARRASSMENT, FRUSTRATION): 0 - NEVER BOTHERED
RATE HOW BOTHERED YOU ARE BY SYMPTOMS OF YOUR SKIN PROBLEM (EG, ITCHING, STINGING BURNING, HURTING OR SKIN IRRITATION): 3
WHAT SINGLE SKIN CONDITION LISTED BELOW IS THE PATIENT ANSWERING THE QUALITY-OF-LIFE ASSESSMENT QUESTIONS ABOUT: NONE OF THE ABOVE
WHAT SINGLE SKIN CONDITION LISTED BELOW IS THE PATIENT ANSWERING THE QUALITY-OF-LIFE ASSESSMENT QUESTIONS ABOUT: NONE OF THE ABOVE
RATE HOW BOTHERED YOU ARE BY EFFECTS OF YOUR SKIN PROBLEMS ON YOUR ACTIVITIES (EG, GOING OUT, ACCOMPLISHING WHAT YOU WANT, WORK ACTIVITIES OR YOUR RELATIONSHIPS WITH OTHERS): 2
RATE HOW EMOTIONALLY BOTHERED YOU ARE BY YOUR SKIN PROBLEM (FOR EXAMPLE, WORRY, EMBARRASSMENT, FRUSTRATION): 0 - NEVER BOTHERED
DATE THE QUALITY-OF-LIFE ASSESSMENT WAS COMPLETED: 67244
RATE HOW BOTHERED YOU ARE BY SYMPTOMS OF YOUR SKIN PROBLEM (EG, ITCHING, STINGING BURNING, HURTING OR SKIN IRRITATION): 3
RATE HOW BOTHERED YOU ARE BY EFFECTS OF YOUR SKIN PROBLEMS ON YOUR ACTIVITIES (EG, GOING OUT, ACCOMPLISHING WHAT YOU WANT, WORK ACTIVITIES OR YOUR RELATIONSHIPS WITH OTHERS): 2

## 2025-02-08 ASSESSMENT — PATIENT GLOBAL ASSESSMENT (PGA): WHAT IS THE PGA: PATIENT GLOBAL ASSESSMENT:  3 - MODERATE

## 2025-02-10 ENCOUNTER — TELEMEDICINE (OUTPATIENT)
Dept: PHARMACY | Facility: HOSPITAL | Age: 70
End: 2025-02-10
Payer: MEDICARE

## 2025-02-10 ENCOUNTER — APPOINTMENT (OUTPATIENT)
Dept: DERMATOLOGY | Facility: CLINIC | Age: 70
End: 2025-02-10
Payer: MEDICARE

## 2025-02-10 DIAGNOSIS — M79.3 PANNICULITIS: Primary | ICD-10-CM

## 2025-02-10 DIAGNOSIS — E66.01 MORBID OBESITY WITH BODY MASS INDEX (BMI) OF 40.0 OR HIGHER (MULTI): ICD-10-CM

## 2025-02-10 DIAGNOSIS — L98.9 DERMATOLOGIC PROBLEM: ICD-10-CM

## 2025-02-10 PROCEDURE — 1159F MED LIST DOCD IN RCRD: CPT | Performed by: DERMATOLOGY

## 2025-02-10 PROCEDURE — 99213 OFFICE O/P EST LOW 20 MIN: CPT | Performed by: DERMATOLOGY

## 2025-02-10 PROCEDURE — 1123F ACP DISCUSS/DSCN MKR DOCD: CPT | Performed by: DERMATOLOGY

## 2025-02-10 PROCEDURE — 1036F TOBACCO NON-USER: CPT | Performed by: DERMATOLOGY

## 2025-02-10 PROCEDURE — 1160F RVW MEDS BY RX/DR IN RCRD: CPT | Performed by: DERMATOLOGY

## 2025-02-10 RX ORDER — PIMECROLIMUS 10 MG/G
CREAM TOPICAL
Qty: 100 G | Refills: 11 | Status: SHIPPED | OUTPATIENT
Start: 2025-02-10

## 2025-02-10 NOTE — PROGRESS NOTES
"Subjective     Kandi A Josephino \"Arlene\" is a 69 y.o. female who presents for the following: Panniculitis (B/L lower legs- history IMK last visit, pt using clobetasol. States improving. ).     Review of Systems:  No other skin or systemic complaints other than what is documented elsewhere in the note.    The following portions of the chart were reviewed this encounter and updated as appropriate:   Tobacco  Allergies  Meds  Problems  Med Hx  Surg Hx  Fam Hx                Objective   Well appearing patient in no apparent distress; mood and affect are within normal limits.    A focused skin examination was performed. All findings within normal limits unless otherwise noted below.    Assessment/Plan   1. Panniculitis (2)  Left Lower Leg - Anterior, Right Lower Leg - Anterior  Hyperpigmenting subcutaneous plaques with edema on the bilateral pretibial legs and now on the L posterior lower leg    Patient with history of lower extremity panniculitis; was previously quiescent for years and off treatment but now with recent flares  -Improved after IMK at last visit 1 month ago  -Patient not using topical steroids  -Recommend to restart topical clobetasol twice daily for 2 weeks  -Then discontinue topical steroids and start topical pimecrolimus twice daily for maintenance  -Once new areas of inflammation arise, change from pimecrolimus back to clobetasol  -Continue emollients with Aveeno Eczema Therapy  -Continue leg elevation to help with edema component     Prior History:  70 yo F with 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 Ruiz for years, with some improvement on beta dip and elidel, " and no change on pentoxyfylline  -The condition was stable and asx for several years, and patient had heart surgery recently and has experienced a significant flare in her legs with swelling and return of redness and now weeping of seous fluid for over 1 month. Pt received a course of Keflex followed by a course of Bactrim with no improvement  -Exam previously has been consistent clinically with NLD with significant edema leading to breakdown of atrophic skin with weeping of serous fluid, and superimposed stasis dermatitis    Related Procedures  Follow Up In Dermatology - Established Patient    Related Medications  triamcinolone (Kenalog) 0.1 % ointment  Apply to affected areas on the lower leg twice daily when active as needed.    clobetasol (Temovate) 0.05 % ointment  Apply to affected areas on the lower legs twice daily when active as needed.    pimecrolimus (Elidel) 1 % cream  Apply to affected areas on the lower legs twice daily for maintenance to prevent recurrence.    2. Dermatologic problem    Related Procedures  Follow Up In Dermatology - Established Patient        Follow up in 3 months for panniculitis  Discussed if there are any changes or development of concerning symptoms (lesion/skin condition is changing, bleeding, enlarging, or worsening) the patient is to contact my office. The patient verbalizes understanding.    Lena Medley MD  2/10/2025

## 2025-02-10 NOTE — ASSESSMENT & PLAN NOTE
Patient has a BMI of 52.18 kg/m².  Patient was referred for initiation/ coverage assistance of a GLP-1 for weight loss.  Patient's insurance does not cover weight loss medications, however a PA was approved for Zepbound due to patient's history of sleep apnea.      Zepbound Education:     - Counseled patient on MOA, expectations, side effects, duration of therapy, contraindications, administration, and monitoring parameters  - Answered all patient questions and concerns  - Counseled patient on Zepbound MOA, expectations, side effects, duration of therapy, administration, and monitoring parameters.  - Provided detailed dosing and administration counseling to ensure proper technique.   - Reviewed Zepbound titration schedule, starting with 2.5 mg once weekly to a goal of 15 mg once weekly if tolerated  - Counseled patient on the benefits of GLP-1ra glycemic control and weight loss  - Reviewed storage requirements of Zepbound when not in use, and when to administer the medication if a dose is missed.  - Advised patient that they may experience improved satiety after meals and portion sizes of meals may be reduced as doses of Zepbound increase.      PLAN:   - START Zepbound 2.5 mg once weekly injection.  Prescription sent to [facility] per patient request.    - Continue diet and lifestyle changes to assist with weight loss.    Follow up: 3 weeks

## 2025-02-10 NOTE — PROGRESS NOTES
"Subjective   Patient ID: Kandi Siddiqui \"Arlene\" is a 69 y.o. female who presents for Weight Loss and Prediabetes.    Referring Provider: Abilio Espinoza MD     HPI  Patient has joined a Silver Sneakers program to help with exercise.  She also states that her and her  have been eating and following a mediterranean diet.  Patient was previously on Rybelsus at the end of 2023 for 3 months.  While on the medication patient stated that she lost 12 lbs on the starting dose and only had a little nausea.   However she received a letter that her insurance will no longer be covering the medication.      Weight Loss  Starting Weight: 304 lbs  Starting BMI: 52.18 kg/m²      Current Weight: 304 lbs   Current BMI: 52.18 kg/m²     Comorbid Conditions:  HTN: Yes   Cholesterol: Yes  Sleep apnea  CAD       The ASCVD Risk score (Arin HAHN, et al., 2019) failed to calculate for the following reasons:    Risk score cannot be calculated because patient has a medical history suggesting prior/existing ASCVD    Review of Systems    Medication System Management:  Affordability/Accessibility: None  Adherence/Organization: None  Adverse Effects: None    Objective     LMP  (LMP Unknown)      Labs  Lab Results   Component Value Date    BILITOT 0.3 04/16/2024    CALCIUM 9.3 04/16/2024    CO2 25 04/16/2024     04/16/2024    CREATININE 1.00 04/16/2024    GLUCOSE 101 (H) 04/16/2024    ALKPHOS 95 04/16/2024    K 4.6 04/16/2024    PROT 6.8 04/16/2024     04/16/2024    AST 34 04/16/2024    ALT 30 04/16/2024    BUN 16 04/16/2024    ANIONGAP 16 04/16/2024    MG 2.00 04/23/2022    PHOS 3.1 04/23/2022    ALBUMIN 3.8 04/16/2024    GFRF 72 04/21/2023     Lab Results   Component Value Date    TRIG 174 (H) 01/23/2024    CHOL 136 01/23/2024    LDLCALC 53 01/23/2024    HDL 48.4 01/23/2024     Lab Results   Component Value Date    HGBA1C 5.8 (H) 07/30/2024       Current Outpatient Medications on File Prior to Visit   Medication Sig Dispense " Refill    acetaminophen (Tylenol) 325 mg tablet Take 1 tablet (325 mg) by mouth every 6 hours if needed.      aspirin 81 mg EC tablet Take 1 tablet (81 mg) by mouth once daily.      clobetasol (Temovate) 0.05 % ointment Apply to affected areas on the lower legs twice daily when active as needed. 120 g 1    metoprolol tartrate (Lopressor) 25 mg tablet Take 1 tablet (25 mg) by mouth 2 times a day. 180 tablet 3    multivitamin-min-iron-FA-vit K (Multi For Her) 18 mg iron-600 mcg-40 mcg capsule Take by mouth.      [] nirmatrelvir-ritonavir (Paxlovid) 300 mg (150 mg x 2)-100 mg tablet therapy pack Take 3 tablets by mouth 2 times a day for 5 days. Follow the instructions on the package 30 tablet 0    nitroglycerin (Nitrostat) 0.4 mg SL tablet Place 1 tablet (0.4 mg) under the tongue every 5 minutes if needed for chest pain. May repeat every 5 minutes for up to 3 doses. 15 tablet 0    rosuvastatin (Crestor) 40 mg tablet Take 1 tablet (40 mg) by mouth once daily. 100 tablet 2    scopolamine (Transderm-Scop) 1 mg over 3 days patch 3 day Place 1 patch over 72 hours on the skin every 3rd day if needed (nausea). 10 patch 0    traZODone (Desyrel) 100 mg tablet TAKE 1 TABLET BY MOUTH ONCE  DAILY AT BEDTIME 90 tablet 3    triamcinolone (Kenalog) 0.1 % ointment Apply to affected areas on the lower leg twice daily when active as needed. 453.6 g 1     No current facility-administered medications on file prior to visit.        Assessment/Plan   Problem List Items Addressed This Visit       Morbid obesity with body mass index (BMI) of 40.0 or higher (Multi)     Patient has a BMI of 52.18 kg/m².  Patient was referred for initiation/ coverage assistance of a GLP-1 for weight loss.  Patient's insurance does not cover weight loss medications, however a PA was approved for Zepbound due to patient's history of sleep apnea.      Zepbound Education:     - Counseled patient on MOA, expectations, side effects, duration of therapy,  contraindications, administration, and monitoring parameters  - Answered all patient questions and concerns  - Counseled patient on Zepbound MOA, expectations, side effects, duration of therapy, administration, and monitoring parameters.  - Provided detailed dosing and administration counseling to ensure proper technique.   - Reviewed Zepbound titration schedule, starting with 2.5 mg once weekly to a goal of 15 mg once weekly if tolerated  - Counseled patient on the benefits of GLP-1ra glycemic control and weight loss  - Reviewed storage requirements of Zepbound when not in use, and when to administer the medication if a dose is missed.  - Advised patient that they may experience improved satiety after meals and portion sizes of meals may be reduced as doses of Zepbound increase.      PLAN:   - START Zepbound 2.5 mg once weekly injection.  Prescription sent to [facility] per patient request.    - Continue diet and lifestyle changes to assist with weight loss.    Follow up: 3 weeks          Relevant Medications    tirzepatide, weight loss, (Zepbound) 2.5 mg/0.5 mL injection    Other Relevant Orders    Referral to Clinical Pharmacy     Debra Hodges, PharmD    Continue all meds under the continuation of care with the referring provider and clinical pharmacy team.

## 2025-02-14 ENCOUNTER — APPOINTMENT (OUTPATIENT)
Dept: RADIOLOGY | Facility: CLINIC | Age: 70
End: 2025-02-14
Payer: MEDICARE

## 2025-02-24 ENCOUNTER — PATIENT MESSAGE (OUTPATIENT)
Dept: CARDIOLOGY | Facility: CLINIC | Age: 70
End: 2025-02-24

## 2025-02-24 ENCOUNTER — APPOINTMENT (OUTPATIENT)
Dept: PHARMACY | Facility: HOSPITAL | Age: 70
End: 2025-02-24
Payer: MEDICARE

## 2025-02-24 DIAGNOSIS — I10 PRIMARY HYPERTENSION: ICD-10-CM

## 2025-02-24 RX ORDER — METOPROLOL TARTRATE 25 MG/1
25 TABLET, FILM COATED ORAL 2 TIMES DAILY
Qty: 180 TABLET | Refills: 3 | Status: SHIPPED | OUTPATIENT
Start: 2025-02-24

## 2025-03-03 ENCOUNTER — APPOINTMENT (OUTPATIENT)
Dept: PHARMACY | Facility: HOSPITAL | Age: 70
End: 2025-03-03
Payer: MEDICARE

## 2025-03-03 ENCOUNTER — HOSPITAL ENCOUNTER (OUTPATIENT)
Dept: RADIOLOGY | Facility: CLINIC | Age: 70
Discharge: HOME | End: 2025-03-03
Payer: MEDICARE

## 2025-03-03 VITALS — WEIGHT: 293 LBS | HEIGHT: 64 IN | BODY MASS INDEX: 50.02 KG/M2

## 2025-03-03 DIAGNOSIS — Z12.31 BREAST CANCER SCREENING BY MAMMOGRAM: ICD-10-CM

## 2025-03-03 DIAGNOSIS — E66.01 MORBID OBESITY WITH BODY MASS INDEX (BMI) OF 40.0 OR HIGHER (MULTI): Primary | ICD-10-CM

## 2025-03-03 PROCEDURE — 77063 BREAST TOMOSYNTHESIS BI: CPT | Performed by: RADIOLOGY

## 2025-03-03 PROCEDURE — 77067 SCR MAMMO BI INCL CAD: CPT | Performed by: RADIOLOGY

## 2025-03-03 PROCEDURE — 77067 SCR MAMMO BI INCL CAD: CPT

## 2025-03-03 NOTE — PROGRESS NOTES
"Subjective   Patient ID: Kandi Siddiqui \"Arlene\" is a 69 y.o. female who presents for Weight Loss.    Referring Provider: Abilio Espinoza MD     HPI  Patient has joined a [employer]ne[employer] program to help with exercise.  She also states that her and her  have been eating and following a mediterranean diet.  Patient was previously on Rybelsus at the end of 2023 for 3 months.  While on the medication patient stated that she lost 12 lbs on the starting dose and only had a little nausea.   However she received a letter that her insurance will no longer be covering the medication.      Weight Loss  Starting Weight: 304 lbs  Starting BMI: 52.18 kg/m²      Current Weight: 296 lbs   Current BMI: 51.9 kg/m²     Comorbid Conditions:  HTN: Yes   Cholesterol: Yes  Sleep apnea  CAD     Diet/Lifestyle:   - Not eating any fried foods or desserts  - Continuing intermittent fasting every other day   - Mediterranean diet     The ASCVD Risk score (Arin HAHN, et al., 2019) failed to calculate for the following reasons:    Risk score cannot be calculated because patient has a medical history suggesting prior/existing ASCVD    Review of Systems    Medication System Management:  Affordability/Accessibility: None  Adherence/Organization: None  Adverse Effects: None    Objective     LMP  (LMP Unknown)      Labs  Lab Results   Component Value Date    BILITOT 0.3 04/16/2024    CALCIUM 9.3 04/16/2024    CO2 25 04/16/2024     04/16/2024    CREATININE 1.00 04/16/2024    GLUCOSE 101 (H) 04/16/2024    ALKPHOS 95 04/16/2024    K 4.6 04/16/2024    PROT 6.8 04/16/2024     04/16/2024    AST 34 04/16/2024    ALT 30 04/16/2024    BUN 16 04/16/2024    ANIONGAP 16 04/16/2024    MG 2.00 04/23/2022    PHOS 3.1 04/23/2022    ALBUMIN 3.8 04/16/2024    GFRF 72 04/21/2023     Lab Results   Component Value Date    TRIG 174 (H) 01/23/2024    CHOL 136 01/23/2024    LDLCALC 53 01/23/2024    HDL 48.4 01/23/2024     Lab Results   Component Value Date "    HGBA1C 5.8 (H) 07/30/2024       Current Outpatient Medications on File Prior to Visit   Medication Sig Dispense Refill    acetaminophen (Tylenol) 325 mg tablet Take 1 tablet (325 mg) by mouth every 6 hours if needed.      aspirin 81 mg EC tablet Take 1 tablet (81 mg) by mouth once daily.      clobetasol (Temovate) 0.05 % ointment Apply to affected areas on the lower legs twice daily when active as needed. 120 g 1    metoprolol tartrate (Lopressor) 25 mg tablet Take 1 tablet (25 mg) by mouth 2 times a day. 180 tablet 3    multivitamin-min-iron-FA-vit K (Multi For Her) 18 mg iron-600 mcg-40 mcg capsule Take by mouth.      nitroglycerin (Nitrostat) 0.4 mg SL tablet Place 1 tablet (0.4 mg) under the tongue every 5 minutes if needed for chest pain. May repeat every 5 minutes for up to 3 doses. 15 tablet 0    pimecrolimus (Elidel) 1 % cream Apply to affected areas on the lower legs twice daily for maintenance to prevent recurrence. 100 g 11    rosuvastatin (Crestor) 40 mg tablet Take 1 tablet (40 mg) by mouth once daily. 100 tablet 2    scopolamine (Transderm-Scop) 1 mg over 3 days patch 3 day Place 1 patch over 72 hours on the skin every 3rd day if needed (nausea). 10 patch 0    traZODone (Desyrel) 100 mg tablet TAKE 1 TABLET BY MOUTH ONCE  DAILY AT BEDTIME 90 tablet 3    triamcinolone (Kenalog) 0.1 % ointment Apply to affected areas on the lower leg twice daily when active as needed. (Patient not taking: Reported on 2/10/2025) 453.6 g 1    [DISCONTINUED] tirzepatide, weight loss, (Zepbound) 2.5 mg/0.5 mL injection Inject 2.5 mg under the skin every 7 days. 4 each 0     No current facility-administered medications on file prior to visit.        Assessment/Plan   Problem List Items Addressed This Visit       Morbid obesity with body mass index (BMI) of 40.0 or higher (Multi) - Primary     Patient has a starting BMI of 52.18 kg/m².  Since last visit the patient started Zepbound and reports only a little nausea the first  2 days after the injection but it goes away.  The patient has noticed a decrease in appetite and a weight loss of 8 lbs.  Discussed increasing the dose and patient was agreeable.        PLAN:   - INCREASE Zepbound 5 mg once weekly injection.  Prescription sent to [facility] per patient request.    - Continue diet and lifestyle changes to assist with weight loss.    Follow up: 5 weeks          Relevant Medications    tirzepatide, weight loss, (Zepbound) 5 mg/0.5 mL injection    Other Relevant Orders    Referral to Clinical Pharmacy     Debra Hodges, PharmD    Continue all meds under the continuation of care with the referring provider and clinical pharmacy team.

## 2025-03-03 NOTE — ASSESSMENT & PLAN NOTE
Patient has a starting BMI of 52.18 kg/m².  Since last visit the patient started Zepbound and reports only a little nausea the first 2 days after the injection but it goes away.  The patient has noticed a decrease in appetite and a weight loss of 8 lbs.  Discussed increasing the dose and patient was agreeable.        PLAN:   - INCREASE Zepbound 5 mg once weekly injection.  Prescription sent to [facility] per patient request.    - Continue diet and lifestyle changes to assist with weight loss.    Follow up: 5 weeks

## 2025-03-04 LAB
ALBUMIN SERPL-MCNC: 4.1 G/DL (ref 3.6–5.1)
ALP SERPL-CCNC: 85 U/L (ref 37–153)
ALT SERPL-CCNC: 18 U/L (ref 6–29)
ANION GAP SERPL CALCULATED.4IONS-SCNC: 15 MMOL/L (CALC) (ref 7–17)
AST SERPL-CCNC: 24 U/L (ref 10–35)
BASOPHILS # BLD AUTO: 43 CELLS/UL (ref 0–200)
BASOPHILS NFR BLD AUTO: 0.6 %
BILIRUB SERPL-MCNC: 0.6 MG/DL (ref 0.2–1.2)
BUN SERPL-MCNC: 17 MG/DL (ref 7–25)
CALCIUM SERPL-MCNC: 9.2 MG/DL (ref 8.6–10.4)
CHLORIDE SERPL-SCNC: 103 MMOL/L (ref 98–110)
CHOLEST SERPL-MCNC: 144 MG/DL
CHOLEST/HDLC SERPL: 2.6 (CALC)
CO2 SERPL-SCNC: 24 MMOL/L (ref 20–32)
CREAT SERPL-MCNC: 0.93 MG/DL (ref 0.5–1.05)
EGFRCR SERPLBLD CKD-EPI 2021: 67 ML/MIN/1.73M2
EOSINOPHIL # BLD AUTO: 99 CELLS/UL (ref 15–500)
EOSINOPHIL NFR BLD AUTO: 1.4 %
ERYTHROCYTE [DISTWIDTH] IN BLOOD BY AUTOMATED COUNT: 12.6 % (ref 11–15)
EST. AVERAGE GLUCOSE BLD GHB EST-MCNC: 134 MG/DL
EST. AVERAGE GLUCOSE BLD GHB EST-SCNC: 7.4 MMOL/L
GLUCOSE SERPL-MCNC: 95 MG/DL (ref 65–99)
HBA1C MFR BLD: 6.3 % OF TOTAL HGB
HCT VFR BLD AUTO: 46.6 % (ref 35–45)
HDLC SERPL-MCNC: 55 MG/DL
HGB BLD-MCNC: 15.4 G/DL (ref 11.7–15.5)
LDLC SERPL CALC-MCNC: 70 MG/DL (CALC)
LYMPHOCYTES # BLD AUTO: 1612 CELLS/UL (ref 850–3900)
LYMPHOCYTES NFR BLD AUTO: 22.7 %
MCH RBC QN AUTO: 31.2 PG (ref 27–33)
MCHC RBC AUTO-ENTMCNC: 33 G/DL (ref 32–36)
MCV RBC AUTO: 94.5 FL (ref 80–100)
MONOCYTES # BLD AUTO: 504 CELLS/UL (ref 200–950)
MONOCYTES NFR BLD AUTO: 7.1 %
NEUTROPHILS # BLD AUTO: 4842 CELLS/UL (ref 1500–7800)
NEUTROPHILS NFR BLD AUTO: 68.2 %
NONHDLC SERPL-MCNC: 89 MG/DL (CALC)
PLATELET # BLD AUTO: 131 THOUSAND/UL (ref 140–400)
PMV BLD REES-ECKER: 13.5 FL (ref 7.5–12.5)
POTASSIUM SERPL-SCNC: 4 MMOL/L (ref 3.5–5.3)
PROT SERPL-MCNC: 6.8 G/DL (ref 6.1–8.1)
RBC # BLD AUTO: 4.93 MILLION/UL (ref 3.8–5.1)
SODIUM SERPL-SCNC: 142 MMOL/L (ref 135–146)
TRIGL SERPL-MCNC: 107 MG/DL
WBC # BLD AUTO: 7.1 THOUSAND/UL (ref 3.8–10.8)

## 2025-03-10 ENCOUNTER — HOSPITAL ENCOUNTER (OUTPATIENT)
Dept: RADIOLOGY | Facility: CLINIC | Age: 70
Discharge: HOME | End: 2025-03-10
Payer: MEDICARE

## 2025-03-10 DIAGNOSIS — Z95.2 STATUS POST COMBINED AORTIC ROOT AND VALVE REPLACEMENT USING STENTLESS BIOPROSTHETIC AORTIC VALVE: Chronic | ICD-10-CM

## 2025-03-10 DIAGNOSIS — I77.810 MILD DILATION OF ASCENDING AORTA (CMS-HCC): ICD-10-CM

## 2025-03-10 DIAGNOSIS — Z95.4 STATUS POST COMBINED AORTIC ROOT AND VALVE REPLACEMENT USING STENTLESS BIOPROSTHETIC AORTIC VALVE: Chronic | ICD-10-CM

## 2025-03-10 PROCEDURE — 71275 CT ANGIOGRAPHY CHEST: CPT

## 2025-03-10 PROCEDURE — 2550000001 HC RX 255 CONTRASTS: Performed by: INTERNAL MEDICINE

## 2025-03-10 RX ADMIN — IOHEXOL 75 ML: 350 INJECTION, SOLUTION INTRAVENOUS at 14:10

## 2025-03-31 PROBLEM — I25.10 CAD (CORONARY ARTERY DISEASE): Chronic | Status: ACTIVE | Noted: 2023-10-22

## 2025-04-07 ENCOUNTER — APPOINTMENT (OUTPATIENT)
Dept: PHARMACY | Facility: HOSPITAL | Age: 70
End: 2025-04-07
Payer: MEDICARE

## 2025-04-07 DIAGNOSIS — E66.01 MORBID OBESITY WITH BODY MASS INDEX (BMI) OF 40.0 OR HIGHER (MULTI): ICD-10-CM

## 2025-04-07 NOTE — PROGRESS NOTES
"Subjective   Patient ID: Kandi Siddiqui \"Arlene\" is a 70 y.o. female who presents for Weight Loss.    Referring Provider: Abilio Espinoza MD     HPI  Patient has joined a [employer] program to help with exercise.  She also states that her and her  have been eating and following a mediterranean diet.  Patient was previously on Rybelsus at the end of 2023 for 3 months.  While on the medication patient stated that she lost 12 lbs on the starting dose and only had a little nausea.   However she received a letter that her insurance will no longer be covering the medication.      Weight Loss  Starting Weight: 304 lbs  Starting BMI: 52.18 kg/m²      Current Weight: 286 lbs   Current BMI: 50 kg/m²     Comorbid Conditions:  HTN: Yes   Cholesterol: Yes  Sleep apnea  CAD     Diet/Lifestyle:   - Not eating any fried foods or desserts  - Continuing intermittent fasting every other day   - Mediterranean diet     The ASCVD Risk score (Arin HAHN, et al., 2019) failed to calculate for the following reasons:    Risk score cannot be calculated because patient has a medical history suggesting prior/existing ASCVD    Review of Systems    Medication System Management:  Affordability/Accessibility: None  Adherence/Organization: None  Adverse Effects: None    Objective     LMP  (LMP Unknown)      Labs  Lab Results   Component Value Date    BILITOT 0.6 03/03/2025    CALCIUM 9.2 03/03/2025    CO2 24 03/03/2025     03/03/2025    CREATININE 0.93 03/03/2025    GLUCOSE 95 03/03/2025    ALKPHOS 85 03/03/2025    K 4.0 03/03/2025    PROT 6.8 03/03/2025     03/03/2025    AST 24 03/03/2025    ALT 18 03/03/2025    BUN 17 03/03/2025    ANIONGAP 15 03/03/2025    MG 2.00 04/23/2022    PHOS 3.1 04/23/2022    ALBUMIN 4.1 03/03/2025    GFRF 72 04/21/2023     Lab Results   Component Value Date    TRIG 107 03/03/2025    CHOL 144 03/03/2025    LDLCALC 70 03/03/2025    HDL 55 03/03/2025     Lab Results   Component Value Date    HGBA1C " 6.3 (H) 03/03/2025       Current Outpatient Medications on File Prior to Visit   Medication Sig Dispense Refill    acetaminophen (Tylenol) 325 mg tablet Take 1 tablet (325 mg) by mouth every 6 hours if needed.      aspirin 81 mg EC tablet Take 1 tablet (81 mg) by mouth once daily.      clobetasol (Temovate) 0.05 % ointment Apply to affected areas on the lower legs twice daily when active as needed. 120 g 1    doxycycline (Vibramycin) 100 mg capsule Take one pill by mouth twice daily with food and at least 8 ounces (large glass) of water, do not lie down for 30 minutes after taking. 60 capsule 0    metoprolol tartrate (Lopressor) 25 mg tablet Take 1 tablet (25 mg) by mouth 2 times a day. 180 tablet 3    multivitamin-min-iron-FA-vit K (Multi For Her) 18 mg iron-600 mcg-40 mcg capsule Take by mouth.      nitroglycerin (Nitrostat) 0.4 mg SL tablet Place 1 tablet (0.4 mg) under the tongue every 5 minutes if needed for chest pain. May repeat every 5 minutes for up to 3 doses. 15 tablet 0    pimecrolimus (Elidel) 1 % cream Apply to affected areas on the lower legs twice daily for maintenance to prevent recurrence. 100 g 11    rosuvastatin (Crestor) 40 mg tablet Take 1 tablet (40 mg) by mouth once daily. 100 tablet 2    scopolamine (Transderm-Scop) 1 mg over 3 days patch 3 day Place 1 patch over 72 hours on the skin every 3rd day if needed (nausea). 10 patch 0    traZODone (Desyrel) 100 mg tablet TAKE 1 TABLET BY MOUTH ONCE  DAILY AT BEDTIME 90 tablet 3    triamcinolone (Kenalog) 0.1 % ointment Apply to affected areas on the lower leg twice daily when active as needed. (Patient not taking: Reported on 2/10/2025) 453.6 g 1    [DISCONTINUED] tirzepatide, weight loss, (Zepbound) 5 mg/0.5 mL injection Inject 5 mg under the skin every 7 days. 4 each 0     No current facility-administered medications on file prior to visit.        Assessment/Plan   Problem List Items Addressed This Visit       Morbid obesity with body mass index  (BMI) of 40.0 or higher (Multi)     Patient has a starting BMI of 52.18 kg/m².  Since last visit the patient started the increased dose of Zepbound and reports only a little nausea with the first 2 doses only  The patient has noticed a decrease in appetite and a weight loss of 18 lbs.  With patient still losing weight will continue with current dose.      PLAN:   - Continue Zepbound 5 mg once weekly injection.  Prescription sent to [facility] per patient request.    - Continue diet and lifestyle changes to assist with weight loss.    Follow up: 4 weeks          Relevant Medications    tirzepatide, weight loss, (Zepbound) 5 mg/0.5 mL injection    Other Relevant Orders    Referral to Clinical Pharmacy     Debra Hodges, PharmD    Continue all meds under the continuation of care with the referring provider and clinical pharmacy team.

## 2025-04-07 NOTE — ASSESSMENT & PLAN NOTE
Patient has a starting BMI of 52.18 kg/m².  Since last visit the patient started the increased dose of Zepbound and reports only a little nausea with the first 2 doses only  The patient has noticed a decrease in appetite and a weight loss of 18 lbs.  With patient still losing weight will continue with current dose.      PLAN:   - Continue Zepbound 5 mg once weekly injection.  Prescription sent to [facility] per patient request.    - Continue diet and lifestyle changes to assist with weight loss.    Follow up: 4 weeks

## 2025-04-14 ENCOUNTER — APPOINTMENT (OUTPATIENT)
Dept: CARDIOLOGY | Facility: CLINIC | Age: 70
End: 2025-04-14
Payer: MEDICARE

## 2025-04-20 PROBLEM — D12.2 ADENOMATOUS POLYP OF ASCENDING COLON: Status: RESOLVED | Noted: 2023-07-27 | Resolved: 2025-04-20

## 2025-04-20 NOTE — PROGRESS NOTES
Referred by No ref. provider found    HPI     Arlene is here for a yearly follow up. Feeling well. She's lost an additional 5#.  She is exercising and eating well.     Past Medical History:  Problem List Items Addressed This Visit    None     Past Medical History:   Diagnosis Date    Aneurysm of the ascending aorta, without rupture 04/01/2022    Ascending aortic aneurysm    Arthritis     Atherosclerotic heart disease of native coronary artery with unspecified angina pectoris 07/12/2023    Bicuspid aortic valve 07/12/2023    CAD (coronary artery disease) 10/22/2023    Replacement of aortic root with #25 Freestyle, ascending aorta and Hemiarch with a #26 Gelweave, and 2 bypass grafts: Left mammary to LAD and saphenous vein to RCA April 18, 2022      Cataract     Cellulitis of unspecified part of limb 05/18/2022    Cellulitis, leg    Encounter for immunization 11/16/2020    Need for zoster vaccination    Encounter for immunization 11/25/2019    Encounter for immunization    Encounter for screening for malignant neoplasm of colon 11/16/2020    Screening for malignant neoplasm of colon    Encounter for screening for other viral diseases 10/30/2020    Need for hepatitis C screening test    Heart disease     Hyperlipidemia, unspecified 05/06/2014    Hypertension     Jaw pain 04/01/2022    Jaw pain    Lymphedema, not elsewhere classified 05/18/2022    Lymphedema of both lower extremities    Multiple lung nodules 09/28/2017    RUL 4 mm stable with CT 2015 / 2017 / 2020 / 2021  Annual screening due December 2022    Myalgia, other site 05/17/2021    Left buttock pain    Nonrheumatic aortic (valve) stenosis 07/12/2023    AVR  4/18/2022    Obstructive sleep apnea 08/30/2015    Untreated ... 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      Social History:  She reports that she quit smoking about 5 years ago. Her smoking use included cigarettes. She started smoking about 48 years ago. She has a 64.5 pack-year smoking history. She has never used smokeless tobacco. She reports current alcohol use of about 7.0 standard drinks of alcohol per week. She reports that she does not use drugs.    Family History:  Family History   Problem Relation Name Age of Onset    Arthritis Mother Bhavna Choi     Hypertension Mother Bhavna Choi     Alcohol abuse Father Tyler choi     Hypertension Father Tyler choi     Heart disease Sister Bhakti     Heart attack Sister Bhakti     Alcohol abuse Sister Bhakti      Allergies:  Codeine and Neomycin-bacitracnzn-polymyxnb    Outpatient Medications:  Current Outpatient Medications   Medication Instructions    acetaminophen (TYLENOL) 325 mg, Every 6 hours PRN    aspirin 81 mg, Daily    clobetasol (Temovate) 0.05 % ointment Apply to affected areas on the lower legs twice daily when active as needed.    doxycycline (Vibramycin) 100 mg capsule Take one pill by mouth twice daily with food and at least 8 ounces (large glass) of water, do not lie down for 30  minutes after taking.    metoprolol tartrate (LOPRESSOR) 25 mg, oral, 2 times daily    multivitamin-min-iron-FA-vit K (Multi For Her) 18 mg iron-600 mcg-40 mcg capsule Take by mouth.    nitroglycerin (NITROSTAT) 0.4 mg, sublingual, Every 5 min PRN, May repeat every 5 minutes for up to 3 doses.    pimecrolimus (Elidel) 1 % cream Apply to affected areas on the lower legs twice daily for maintenance to prevent recurrence.    rosuvastatin (CRESTOR) 40 mg, oral, Daily    scopolamine (Transderm-Scop) 1 mg over 3 days patch 3 day 1 patch, transdermal, Every 72 hours PRN    tirzepatide (weight loss) (ZEPBOUND) 5 mg, subcutaneous, Every 7 days    traZODone (DESYREL) 100 mg, oral, Nightly    triamcinolone (Kenalog) 0.1 % ointment Apply to affected areas on the lower leg twice daily when active as needed.     Last Recorded Vitals:  There were no vitals filed for this visit.    Physical Exam  Patient is alert and oriented x3.  HEENT is unremarkable mucous members are moist  Neck no JVP no bruits upstrokes are full no thyromegaly  Lungs are clear bilaterally.  No wheezing crackles or rales  Heart regular rhythm normal S1-S2 there is no S3 no murmurs are heard.  Abdomen is soft bs are positive nontender nondistended no organomegaly no pulsatile masses  Extremities have no edema.  Distal pulses present palpable.  Neuro is grossly nonfocal  Skin has no rashes     Last Labs:  CBC -  Lab Results   Component Value Date    WBC 7.1 03/03/2025    HGB 15.4 03/03/2025    HCT 46.6 (H) 03/03/2025    MCV 94.5 03/03/2025     (L) 03/03/2025     CMP -  Lab Results   Component Value Date    CALCIUM 9.2 03/03/2025    PHOS 3.1 04/23/2022    PROT 6.8 03/03/2025    ALBUMIN 4.1 03/03/2025    AST 24 03/03/2025    ALT 18 03/03/2025    ALKPHOS 85 03/03/2025    BILITOT 0.6 03/03/2025     LIPID PANEL -   Lab Results   Component Value Date    CHOL 144 03/03/2025    HDL 55 03/03/2025    CHHDL 2.6 03/03/2025    VLDL 35 01/23/2024    TRIG 107 03/03/2025     NHDL 89 03/03/2025     RENAL FUNCTION PANEL -   Lab Results   Component Value Date    K 4.0 03/03/2025    PHOS 3.1 04/23/2022     Lab Results   Component Value Date    HGBA1C 6.3 (H) 03/03/2025     Procedures    CTA chest 3/10/2025 status post aortic root and arch replacement with a #26 Gelweave graft    ABIs without exercise 8/5/2024 normal       Echo 6/19/2023 EF 55 to 60%, well-positioned statin less freestyle #25 aortic valve.  Peak gradient 6 mmHg.  MAC mild to moderate pulmonary hypertension    Catheterization 3/29/2022 Annelise LAD 70% stenosis circumflex minimal, RCA totally occluded  Assessment/Plan   1.  Bicuspid aortic valve.  Status post aortic valve replacement with a stent was #25 freestyle valve April 18, 2022.  Doing very well.  She had an echo June 2023 in [place].  The valve is well-seated.    2.  Thoracic aortic aneurysm 4.8 cm.  Status post ascending aorta and hemiarch replacement with a #26 Gelweave graft April 18, 2022.  CTA 3/10/2025 thoracic aortic aneurysm repair stable.    3.  CAD.  Status post two-vessel bypass LIMA to the LAD SVG to the RCA April 18, 2022.  RCA known to be occluded in the past with left-to-right collaterals.  Previous myocardial infarction.  Continue risk factor modification.  She is taking baby aspirin, metoprolol tartrate 25 twice daily, and rosuvastatin.  We discussed the importance of daily exercise and getting into a swimming pool.  This will help with blood pressure control lipid control weight loss and decreasing inflammation.    4.  Tobacco abuse.  She stopped smoking in 2020.    5.  Hypertension.  For the most part blood pressures when she is not here are under good control typically in the 130 range.  Exercise and weight loss will help with that.    6.  Hyperlipidemia.  On rosuvastatin 3/3/2025 LDL 70 HDL 55 triglycerides 107 LFTs normal    EKG today, RTC 1 year    Sharita Snider MD     Instructions and follow up

## 2025-04-21 ENCOUNTER — APPOINTMENT (OUTPATIENT)
Dept: CARDIOLOGY | Facility: CLINIC | Age: 70
End: 2025-04-21
Payer: MEDICARE

## 2025-04-21 VITALS
WEIGHT: 292 LBS | SYSTOLIC BLOOD PRESSURE: 124 MMHG | BODY MASS INDEX: 50.1 KG/M2 | OXYGEN SATURATION: 98 % | HEART RATE: 70 BPM | DIASTOLIC BLOOD PRESSURE: 62 MMHG

## 2025-04-21 DIAGNOSIS — Z95.2 STATUS POST COMBINED AORTIC ROOT AND VALVE REPLACEMENT USING STENTLESS BIOPROSTHETIC AORTIC VALVE: Chronic | ICD-10-CM

## 2025-04-21 DIAGNOSIS — E78.5 HYPERLIPIDEMIA, UNSPECIFIED HYPERLIPIDEMIA TYPE: ICD-10-CM

## 2025-04-21 DIAGNOSIS — Z95.4 STATUS POST COMBINED AORTIC ROOT AND VALVE REPLACEMENT USING STENTLESS BIOPROSTHETIC AORTIC VALVE: Chronic | ICD-10-CM

## 2025-04-21 DIAGNOSIS — I25.10 CORONARY ARTERY DISEASE INVOLVING NATIVE CORONARY ARTERY OF NATIVE HEART WITHOUT ANGINA PECTORIS: Primary | Chronic | ICD-10-CM

## 2025-04-21 DIAGNOSIS — E78.00 ELEVATED LDL CHOLESTEROL LEVEL: ICD-10-CM

## 2025-04-21 DIAGNOSIS — I10 PRIMARY HYPERTENSION: ICD-10-CM

## 2025-04-21 PROCEDURE — 1036F TOBACCO NON-USER: CPT | Performed by: INTERNAL MEDICINE

## 2025-04-21 PROCEDURE — 3074F SYST BP LT 130 MM HG: CPT | Performed by: INTERNAL MEDICINE

## 2025-04-21 PROCEDURE — 1160F RVW MEDS BY RX/DR IN RCRD: CPT | Performed by: INTERNAL MEDICINE

## 2025-04-21 PROCEDURE — 99214 OFFICE O/P EST MOD 30 MIN: CPT | Performed by: INTERNAL MEDICINE

## 2025-04-21 PROCEDURE — 1159F MED LIST DOCD IN RCRD: CPT | Performed by: INTERNAL MEDICINE

## 2025-04-21 PROCEDURE — 93005 ELECTROCARDIOGRAM TRACING: CPT | Performed by: INTERNAL MEDICINE

## 2025-04-21 PROCEDURE — 1123F ACP DISCUSS/DSCN MKR DOCD: CPT | Performed by: INTERNAL MEDICINE

## 2025-04-21 PROCEDURE — 3078F DIAST BP <80 MM HG: CPT | Performed by: INTERNAL MEDICINE

## 2025-04-21 RX ORDER — METOPROLOL TARTRATE 25 MG/1
25 TABLET, FILM COATED ORAL 2 TIMES DAILY
Qty: 180 TABLET | Refills: 3 | Status: SHIPPED | OUTPATIENT
Start: 2025-04-21

## 2025-04-21 RX ORDER — ROSUVASTATIN CALCIUM 40 MG/1
40 TABLET, COATED ORAL DAILY
Qty: 90 TABLET | Refills: 3 | Status: SHIPPED | OUTPATIENT
Start: 2025-04-21 | End: 2026-04-21

## 2025-04-21 NOTE — PATIENT INSTRUCTIONS
1.  Bicuspid aortic valve.  Status post aortic valve replacement with a stent was #25 freestyle valve April 18, 2022.  Doing very well.  She had an echo June 2023 in [place].  The valve is well-seated.    2.  Thoracic aortic aneurysm 4.8 cm.  Status post ascending aorta and hemiarch replacement with a #26 Gelweave graft April 18, 2022.  CTA 3/10/2025 thoracic aortic aneurysm repair stable.    3.  CAD.  Status post two-vessel bypass LIMA to the LAD SVG to the RCA April 18, 2022.  RCA known to be occluded in the past with left-to-right collaterals.  Previous myocardial infarction.  Continue risk factor modification.  She is taking baby aspirin, metoprolol tartrate 25 twice daily, and rosuvastatin.  We discussed the importance of daily exercise and getting into a swimming pool.  This will help with blood pressure control lipid control weight loss and decreasing inflammation.    4.  Tobacco abuse.  She stopped smoking in 2020.    5.  Hypertension.  For the most part blood pressures when she is not here are under good control typically in the 130 range.  Exercise and weight loss will help with that.    6.  Hyperlipidemia.  On rosuvastatin 3/3/2025 LDL 70 HDL 55 triglycerides 107 LFTs normal    EKG today, RTC 1 year

## 2025-04-22 LAB
ATRIAL RATE: 79 BPM
P AXIS: 81 DEGREES
P OFFSET: 179 MS
P ONSET: 131 MS
PR INTERVAL: 176 MS
Q ONSET: 219 MS
QRS COUNT: 13 BEATS
QRS DURATION: 112 MS
QT INTERVAL: 358 MS
QTC CALCULATION(BAZETT): 410 MS
QTC FREDERICIA: 392 MS
R AXIS: 55 DEGREES
T AXIS: 208 DEGREES
T OFFSET: 398 MS
VENTRICULAR RATE: 79 BPM

## 2025-05-05 ENCOUNTER — APPOINTMENT (OUTPATIENT)
Dept: PHARMACY | Facility: HOSPITAL | Age: 70
End: 2025-05-05
Payer: MEDICARE

## 2025-05-05 DIAGNOSIS — E66.01 MORBID OBESITY WITH BODY MASS INDEX (BMI) OF 40.0 OR HIGHER (MULTI): Primary | ICD-10-CM

## 2025-05-05 NOTE — ASSESSMENT & PLAN NOTE
Patient has a starting BMI of 52.18 kg/m².  Since last visit the patient continued dose of Zepbound and very minimal nausea at this point. The patient has noticed a decrease in appetite and a weight loss of 27 lbs.  Discussed increasing the dose as weight loss has slowed significantly and patient was agreeable.      PLAN:   - INCREASE to Zepbound 7.5 mg once weekly injection.  Prescription sent to [facility] per patient request.    - Continue diet and lifestyle changes to assist with weight loss.    Follow up: 4 weeks

## 2025-05-05 NOTE — PROGRESS NOTES
"Subjective   Patient ID: Kandi Siddiqui \"Arlene\" is a 70 y.o. female who presents for Weight Loss.    Referring Provider: Abilio Espinoza MD     HPI  Patient has joined a [employer] program to help with exercise.  She also states that her and her  have been eating and following a mediterranean diet.  Patient was previously on Rybelsus at the end of 2023 for 3 months.  While on the medication patient stated that she lost 12 lbs on the starting dose and only had a little nausea.   However she received a letter that her insurance will no longer be covering the medication.      Weight Loss  Starting Weight: 304 lbs  Starting BMI: 52.18 kg/m²      Current Weight: 277 lbs   Current BMI: 50 kg/m²     Comorbid Conditions:  HTN: Yes   Cholesterol: Yes  Sleep apnea  CAD     Diet/Lifestyle:   - Not eating any fried foods or desserts  - Continuing intermittent fasting every other day   - Mediterranean diet     The ASCVD Risk score (Arin HAHN, et al., 2019) failed to calculate for the following reasons:    Risk score cannot be calculated because patient has a medical history suggesting prior/existing ASCVD    Review of Systems    Medication System Management:  Affordability/Accessibility: None  Adherence/Organization: None  Adverse Effects: None    Objective     LMP  (LMP Unknown)      Labs  Lab Results   Component Value Date    BILITOT 0.6 03/03/2025    CALCIUM 9.2 03/03/2025    CO2 24 03/03/2025     03/03/2025    CREATININE 0.93 03/03/2025    GLUCOSE 95 03/03/2025    ALKPHOS 85 03/03/2025    K 4.0 03/03/2025    PROT 6.8 03/03/2025     03/03/2025    AST 24 03/03/2025    ALT 18 03/03/2025    BUN 17 03/03/2025    ANIONGAP 15 03/03/2025    MG 2.00 04/23/2022    PHOS 3.1 04/23/2022    ALBUMIN 4.1 03/03/2025    GFRF 72 04/21/2023     Lab Results   Component Value Date    TRIG 107 03/03/2025    CHOL 144 03/03/2025    LDLCALC 70 03/03/2025    HDL 55 03/03/2025     Lab Results   Component Value Date    HGBA1C " 6.3 (H) 03/03/2025       Current Outpatient Medications on File Prior to Visit   Medication Sig Dispense Refill    acetaminophen (Tylenol) 325 mg tablet Take 1 tablet (325 mg) by mouth every 6 hours if needed.      aspirin 81 mg EC tablet Take 1 tablet (81 mg) by mouth once daily.      clobetasol (Temovate) 0.05 % ointment Apply to affected areas on the lower legs twice daily when active as needed. 120 g 1    metoprolol tartrate (Lopressor) 25 mg tablet Take 1 tablet (25 mg) by mouth 2 times a day. 180 tablet 3    multivitamin-min-iron-FA-vit K (Multi For Her) 18 mg iron-600 mcg-40 mcg capsule Take by mouth.      nitroglycerin (Nitrostat) 0.4 mg SL tablet Place 1 tablet (0.4 mg) under the tongue every 5 minutes if needed for chest pain. May repeat every 5 minutes for up to 3 doses. 15 tablet 0    pimecrolimus (Elidel) 1 % cream Apply to affected areas on the lower legs twice daily for maintenance to prevent recurrence. 100 g 11    rosuvastatin (Crestor) 40 mg tablet Take 1 tablet (40 mg) by mouth once daily. 90 tablet 3    scopolamine (Transderm-Scop) 1 mg over 3 days patch 3 day Place 1 patch over 72 hours on the skin every 3rd day if needed (nausea). 10 patch 0    traZODone (Desyrel) 100 mg tablet TAKE 1 TABLET BY MOUTH ONCE  DAILY AT BEDTIME 90 tablet 3    triamcinolone (Kenalog) 0.1 % ointment Apply to affected areas on the lower leg twice daily when active as needed. 453.6 g 1    [DISCONTINUED] tirzepatide, weight loss, (Zepbound) 5 mg/0.5 mL injection Inject 5 mg under the skin every 7 days. 4 each 0     No current facility-administered medications on file prior to visit.        Assessment/Plan   Problem List Items Addressed This Visit       Morbid obesity with body mass index (BMI) of 40.0 or higher (Multi) - Primary    Patient has a starting BMI of 52.18 kg/m².  Since last visit the patient continued dose of Zepbound and very minimal nausea at this point. The patient has noticed a decrease in appetite and a  weight loss of 27 lbs.  Discussed increasing the dose as weight loss has slowed significantly and patient was agreeable.      PLAN:   - INCREASE to Zepbound 7.5 mg once weekly injection.  Prescription sent to [facility] per patient request.    - Continue diet and lifestyle changes to assist with weight loss.    Follow up: 4 weeks          Relevant Medications    tirzepatide, weight loss, (Zepbound) 7.5 mg/0.5 mL injection    Other Relevant Orders    Referral to Clinical Pharmacy     Debra Hodges, PharmD    Continue all meds under the continuation of care with the referring provider and clinical pharmacy team.

## 2025-05-12 ASSESSMENT — DERMATOLOGY QUALITY OF LIFE (QOL) ASSESSMENT
WHAT SINGLE SKIN CONDITION LISTED BELOW IS THE PATIENT ANSWERING THE QUALITY-OF-LIFE ASSESSMENT QUESTIONS ABOUT: DERMATITIS
RATE HOW BOTHERED YOU ARE BY EFFECTS OF YOUR SKIN PROBLEMS ON YOUR ACTIVITIES (EG, GOING OUT, ACCOMPLISHING WHAT YOU WANT, WORK ACTIVITIES OR YOUR RELATIONSHIPS WITH OTHERS): 2
RATE HOW BOTHERED YOU ARE BY SYMPTOMS OF YOUR SKIN PROBLEM (EG, ITCHING, STINGING BURNING, HURTING OR SKIN IRRITATION): 1
RATE HOW BOTHERED YOU ARE BY EFFECTS OF YOUR SKIN PROBLEMS ON YOUR ACTIVITIES (EG, GOING OUT, ACCOMPLISHING WHAT YOU WANT, WORK ACTIVITIES OR YOUR RELATIONSHIPS WITH OTHERS): 2
RATE HOW EMOTIONALLY BOTHERED YOU ARE BY YOUR SKIN PROBLEM (FOR EXAMPLE, WORRY, EMBARRASSMENT, FRUSTRATION): 1
RATE HOW EMOTIONALLY BOTHERED YOU ARE BY YOUR SKIN PROBLEM (FOR EXAMPLE, WORRY, EMBARRASSMENT, FRUSTRATION): 1
RATE HOW BOTHERED YOU ARE BY SYMPTOMS OF YOUR SKIN PROBLEM (EG, ITCHING, STINGING BURNING, HURTING OR SKIN IRRITATION): 1
WHAT SINGLE SKIN CONDITION LISTED BELOW IS THE PATIENT ANSWERING THE QUALITY-OF-LIFE ASSESSMENT QUESTIONS ABOUT: DERMATITIS
DATE THE QUALITY-OF-LIFE ASSESSMENT WAS COMPLETED: 67337

## 2025-05-19 ENCOUNTER — APPOINTMENT (OUTPATIENT)
Dept: DERMATOLOGY | Facility: CLINIC | Age: 70
End: 2025-05-19
Payer: MEDICARE

## 2025-05-19 DIAGNOSIS — L98.9 DERMATOLOGIC PROBLEM: ICD-10-CM

## 2025-05-19 DIAGNOSIS — M79.3 PANNICULITIS: Primary | ICD-10-CM

## 2025-05-19 PROCEDURE — 1160F RVW MEDS BY RX/DR IN RCRD: CPT | Performed by: DERMATOLOGY

## 2025-05-19 PROCEDURE — 1036F TOBACCO NON-USER: CPT | Performed by: DERMATOLOGY

## 2025-05-19 PROCEDURE — 1159F MED LIST DOCD IN RCRD: CPT | Performed by: DERMATOLOGY

## 2025-05-19 PROCEDURE — 99213 OFFICE O/P EST LOW 20 MIN: CPT | Performed by: DERMATOLOGY

## 2025-05-19 NOTE — Clinical Note
Hyperpigmented subcutaneous plaques with edema on the bilateral pretibial legs and L posterior lower leg

## 2025-05-19 NOTE — PROGRESS NOTES
"Subjective     Kandi A Artino \"Arlene\" is a 70 y.o. female who presents for the following: Panniculitis (LV: 2/10/25: b/l lower legs.  Pt is currently using Clobetasol every other day and moisturizing the other days. She notes this is the best her legs have looked in a long time.).     Review of Systems:  No other skin or systemic complaints other than what is documented elsewhere in the note.    The following portions of the chart were reviewed this encounter and updated as appropriate:   Tobacco  Allergies  Meds  Problems  Med Hx  Surg Hx  Fam Hx              Objective   Well appearing patient in no apparent distress; mood and affect are within normal limits.    A focused skin examination was performed. All findings within normal limits unless otherwise noted below.    Assessment/Plan   Skin Exam  1. PANNICULITIS (2)  Left Lower Leg - Anterior, Right Lower Leg - Anterior  Hyperpigmented subcutaneous plaques with edema on the bilateral pretibial legs and L posterior lower leg  Not active today  -Patient is doing well after 4 weeks of doxycycline and now alternating clobetasol every other day with vaseline emollient every other day  -Asymptomatic  -Recommend to continue on topical regimen as described above  -Can restart doxy course x 4 weeks if needed; patient will message if flaring  -Hold pimecrolimus at this time    Prior history:  Patient with history of lower extremity panniculitis; was previously quiescent for years and off treatment but now with recent flares  -Improved after IMK  -Patient not using topical steroids  -Recommend to restart topical clobetasol twice daily for 2 weeks  -Then discontinue topical steroids and start topical pimecrolimus twice daily for maintenance    70 yo F with 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 Ruiz for years, with some improvement on beta dip and elidel, and no change on pentoxyfylline  -The condition was stable and asx for several years, and patient had heart surgery recently and has experienced a significant flare in her legs with swelling and return of redness and now weeping of seous fluid for over 1 month. Pt received a course of Keflex followed by a course of Bactrim with no improvement  -Exam previously has been consistent clinically with NLD with significant edema leading to breakdown of atrophic skin with weeping of serous fluid, and superimposed stasis dermatitis  Related Medications  triamcinolone (Kenalog) 0.1 % ointment  Apply to affected areas on the lower leg twice daily when active as needed.  clobetasol (Temovate) 0.05 % ointment  Apply to affected areas on the lower legs twice daily when active as needed.  pimecrolimus (Elidel) 1 % cream  Apply to affected areas on the lower legs twice daily for maintenance to prevent recurrence.  2. DERMATOLOGIC PROBLEM  Generalized  Related Procedures  Follow Up In Dermatology - Established Patient    Follow up in 6 months for panniculitis. Patient declines FSE.    Discussed if there are any changes or development of concerning symptoms (lesion/skin condition is changing, bleeding, enlarging, or worsening) the patient is to contact my office. The patient verbalizes understanding.    Lena Medley MD  5/19/2025

## 2025-05-19 NOTE — Clinical Note
Not active today  -Patient is doing well after 4 weeks of doxycycline and now alternating clobetasol every other day with vaseline emollient every other day  -Asymptomatic  -Recommend to continue on topical regimen as described above  -Can restart doxy course x 4 weeks if needed; patient will message if flaring  -Hold pimecrolimus at this time    Prior history:  Patient with history of lower extremity panniculitis; was previously quiescent for years and off treatment but now with recent flares  -Improved after IMK  -Patient not using topical steroids  -Recommend to restart topical clobetasol twice daily for 2 weeks  -Then discontinue topical steroids and start topical pimecrolimus twice daily for maintenance    68 yo F with 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 Ruiz for years, with some improvement on beta dip and elidel, and no change on pentoxyfylline  -The condition was stable and asx for several years, and patient had heart surgery recently and has experienced a significant flare in her legs with swelling and return of redness and now weeping of seous fluid for over 1 month. Pt received a course of Keflex followed by a course of Bactrim with no improvement  -Exam previously has been consistent clinically with NLD with significant edema leading to breakdown of atrophic skin with weeping of serous fluid, and superimposed stasis dermatitis

## 2025-06-02 ENCOUNTER — APPOINTMENT (OUTPATIENT)
Dept: PHARMACY | Facility: HOSPITAL | Age: 70
End: 2025-06-02
Payer: MEDICARE

## 2025-06-02 DIAGNOSIS — E66.01 MORBID OBESITY WITH BODY MASS INDEX (BMI) OF 40.0 OR HIGHER (MULTI): Primary | ICD-10-CM

## 2025-06-02 NOTE — PROGRESS NOTES
"Subjective   Patient ID: Kandi Siddiqui \"Arlene\" is a 70 y.o. female who presents for Weight Loss.    Referring Provider: Abilio Espinoza MD     HPI  Patient has joined a [employer] program to help with exercise.  She also states that her and her  have been eating and following a mediterranean diet.  Patient was previously on Rybelsus at the end of 2023 for 3 months.  While on the medication patient stated that she lost 12 lbs on the starting dose and only had a little nausea.   However she received a letter that her insurance will no longer be covering the medication.      Weight Loss  Starting Weight: 304 lbs  Starting BMI: 52.18 kg/m²      Current Weight: 272 lbs   Current BMI: 49 kg/m²     Comorbid Conditions:  HTN: Yes   Cholesterol: Yes  Sleep apnea  CAD     Diet/Lifestyle:   - Not eating any fried foods or desserts  - Continuing intermittent fasting every other day   - Mediterranean diet     The ASCVD Risk score (Arin HAHN, et al., 2019) failed to calculate for the following reasons:    Risk score cannot be calculated because patient has a medical history suggesting prior/existing ASCVD    Review of Systems    Medication System Management:  Affordability/Accessibility: None  Adherence/Organization: None  Adverse Effects: None    Objective     LMP  (LMP Unknown)      Labs  Lab Results   Component Value Date    BILITOT 0.6 03/03/2025    CALCIUM 9.2 03/03/2025    CO2 24 03/03/2025     03/03/2025    CREATININE 0.93 03/03/2025    GLUCOSE 95 03/03/2025    ALKPHOS 85 03/03/2025    K 4.0 03/03/2025    PROT 6.8 03/03/2025     03/03/2025    AST 24 03/03/2025    ALT 18 03/03/2025    BUN 17 03/03/2025    ANIONGAP 15 03/03/2025    MG 2.00 04/23/2022    PHOS 3.1 04/23/2022    ALBUMIN 4.1 03/03/2025    GFRF 72 04/21/2023     Lab Results   Component Value Date    TRIG 107 03/03/2025    CHOL 144 03/03/2025    LDLCALC 70 03/03/2025    HDL 55 03/03/2025     Lab Results   Component Value Date    HGBA1C " 6.3 (H) 03/03/2025       Current Outpatient Medications on File Prior to Visit   Medication Sig Dispense Refill    acetaminophen (Tylenol) 325 mg tablet Take 1 tablet (325 mg) by mouth every 6 hours if needed.      aspirin 81 mg EC tablet Take 1 tablet (81 mg) by mouth once daily.      clobetasol (Temovate) 0.05 % ointment Apply to affected areas on the lower legs twice daily when active as needed. 120 g 1    metoprolol tartrate (Lopressor) 25 mg tablet Take 1 tablet (25 mg) by mouth 2 times a day. 180 tablet 3    multivitamin-min-iron-FA-vit K (Multi For Her) 18 mg iron-600 mcg-40 mcg capsule Take by mouth.      nitroglycerin (Nitrostat) 0.4 mg SL tablet Place 1 tablet (0.4 mg) under the tongue every 5 minutes if needed for chest pain. May repeat every 5 minutes for up to 3 doses. 15 tablet 0    pimecrolimus (Elidel) 1 % cream Apply to affected areas on the lower legs twice daily for maintenance to prevent recurrence. (Patient not taking: Reported on 5/19/2025) 100 g 11    rosuvastatin (Crestor) 40 mg tablet Take 1 tablet (40 mg) by mouth once daily. 90 tablet 3    scopolamine (Transderm-Scop) 1 mg over 3 days patch 3 day Place 1 patch over 72 hours on the skin every 3rd day if needed (nausea). 10 patch 0    traZODone (Desyrel) 100 mg tablet TAKE 1 TABLET BY MOUTH ONCE  DAILY AT BEDTIME 90 tablet 3    triamcinolone (Kenalog) 0.1 % ointment Apply to affected areas on the lower leg twice daily when active as needed. (Patient not taking: Reported on 5/19/2025) 453.6 g 1    [DISCONTINUED] tirzepatide, weight loss, (Zepbound) 7.5 mg/0.5 mL injection Inject 7.5 mg under the skin every 7 days. 4 each 0     No current facility-administered medications on file prior to visit.        Assessment/Plan   Problem List Items Addressed This Visit       Morbid obesity with body mass index (BMI) of 40.0 or higher (Multi) - Primary    Patient has a starting BMI of 52.18 kg/m².  Since last visit the patient increased the dose of  Zepbound and reports very minimal nausea at this point with a little constipation. The patient has noticed a decrease in appetite and a weight loss of 32 lbs.  Discussed increasing the dose however the patient is still losing weight at current dose.       PLAN:   - Continue Zepbound 7.5 mg once weekly injection.  Prescription sent to [facility] per patient request.    - Continue diet and lifestyle changes to assist with weight loss.    Follow up: 4 weeks          Relevant Medications    tirzepatide, weight loss, (Zepbound) 7.5 mg/0.5 mL injection    Other Relevant Orders    Referral to Clinical Pharmacy     Debra Hodges, PharmD    Continue all meds under the continuation of care with the referring provider and clinical pharmacy team.

## 2025-06-02 NOTE — ASSESSMENT & PLAN NOTE
Patient has a starting BMI of 52.18 kg/m².  Since last visit the patient increased the dose of Zepbound and reports very minimal nausea at this point with a little constipation. The patient has noticed a decrease in appetite and a weight loss of 32 lbs.  Discussed increasing the dose however the patient is still losing weight at current dose.       PLAN:   - Continue Zepbound 7.5 mg once weekly injection.  Prescription sent to [facility] per patient request.    - Continue diet and lifestyle changes to assist with weight loss.    Follow up: 4 weeks

## 2025-06-17 DIAGNOSIS — G47.33 OSA (OBSTRUCTIVE SLEEP APNEA): ICD-10-CM

## 2025-06-17 RX ORDER — TRAZODONE HYDROCHLORIDE 100 MG/1
100 TABLET ORAL NIGHTLY
Qty: 90 TABLET | Refills: 1 | Status: SHIPPED | OUTPATIENT
Start: 2025-06-17

## 2025-06-30 ENCOUNTER — APPOINTMENT (OUTPATIENT)
Dept: PHARMACY | Facility: HOSPITAL | Age: 70
End: 2025-06-30
Payer: MEDICARE

## 2025-06-30 DIAGNOSIS — E66.01 MORBID OBESITY WITH BODY MASS INDEX (BMI) OF 40.0 OR HIGHER (MULTI): ICD-10-CM

## 2025-06-30 NOTE — PROGRESS NOTES
"Subjective   Patient ID: Kandi Siddiqui \"Arlene\" is a 70 y.o. female who presents for Weight Loss.    Referring Provider: Abilio Espinoza MD     HPI  Patient has joined a [employer] program to help with exercise.  She also states that her and her  have been eating and following a mediterranean diet.  Patient was previously on Rybelsus at the end of 2023 for 3 months.  While on the medication patient stated that she lost 12 lbs on the starting dose and only had a little nausea.   However she received a letter that her insurance will no longer be covering the medication.      Weight Loss  Starting Weight: 304 lbs  Starting BMI: 52.18 kg/m²      Current Weight: 269 lbs   Current BMI: 49 kg/m²     Comorbid Conditions:  HTN: Yes   Cholesterol: Yes  Sleep apnea  CAD     Diet/Lifestyle:   - Not eating any fried foods or desserts  - Continuing intermittent fasting every other day   - Mediterranean diet     The ASCVD Risk score (Arin HAHN, et al., 2019) failed to calculate for the following reasons:    Risk score cannot be calculated because patient has a medical history suggesting prior/existing ASCVD    Review of Systems    Medication System Management:  Affordability/Accessibility: None  Adherence/Organization: None  Adverse Effects: None    Objective     LMP  (LMP Unknown)      Labs  Lab Results   Component Value Date    BILITOT 0.6 03/03/2025    CALCIUM 9.2 03/03/2025    CO2 24 03/03/2025     03/03/2025    CREATININE 0.93 03/03/2025    GLUCOSE 95 03/03/2025    ALKPHOS 85 03/03/2025    K 4.0 03/03/2025    PROT 6.8 03/03/2025     03/03/2025    AST 24 03/03/2025    ALT 18 03/03/2025    BUN 17 03/03/2025    ANIONGAP 15 03/03/2025    MG 2.00 04/23/2022    PHOS 3.1 04/23/2022    ALBUMIN 4.1 03/03/2025    GFRF 72 04/21/2023     Lab Results   Component Value Date    TRIG 107 03/03/2025    CHOL 144 03/03/2025    LDLCALC 70 03/03/2025    HDL 55 03/03/2025     Lab Results   Component Value Date    HGBA1C " 6.3 (H) 03/03/2025       Current Outpatient Medications on File Prior to Visit   Medication Sig Dispense Refill    acetaminophen (Tylenol) 325 mg tablet Take 1 tablet (325 mg) by mouth every 6 hours if needed.      aspirin 81 mg EC tablet Take 1 tablet (81 mg) by mouth once daily.      clobetasol (Temovate) 0.05 % ointment Apply to affected areas on the lower legs twice daily when active as needed. 120 g 1    metoprolol tartrate (Lopressor) 25 mg tablet Take 1 tablet (25 mg) by mouth 2 times a day. 180 tablet 3    multivitamin-min-iron-FA-vit K (Multi For Her) 18 mg iron-600 mcg-40 mcg capsule Take by mouth.      nitroglycerin (Nitrostat) 0.4 mg SL tablet Place 1 tablet (0.4 mg) under the tongue every 5 minutes if needed for chest pain. May repeat every 5 minutes for up to 3 doses. 15 tablet 0    pimecrolimus (Elidel) 1 % cream Apply to affected areas on the lower legs twice daily for maintenance to prevent recurrence. (Patient not taking: Reported on 5/19/2025) 100 g 11    rosuvastatin (Crestor) 40 mg tablet Take 1 tablet (40 mg) by mouth once daily. 90 tablet 3    scopolamine (Transderm-Scop) 1 mg over 3 days patch 3 day Place 1 patch over 72 hours on the skin every 3rd day if needed (nausea). 10 patch 0    traZODone (Desyrel) 100 mg tablet Take 1 tablet (100 mg) by mouth once daily at bedtime. 90 tablet 1    triamcinolone (Kenalog) 0.1 % ointment Apply to affected areas on the lower leg twice daily when active as needed. (Patient not taking: Reported on 5/19/2025) 453.6 g 1    [DISCONTINUED] tirzepatide, weight loss, (Zepbound) 7.5 mg/0.5 mL injection Inject 7.5 mg under the skin every 7 days. 4 each 0     No current facility-administered medications on file prior to visit.        Assessment/Plan   Problem List Items Addressed This Visit       Morbid obesity with body mass index (BMI) of 40.0 or higher (Multi)    Patient has a starting BMI of 52.18 kg/m².  Since last visit the patient increased the dose of  Zepbound and reports very minimal nausea at this point. The patient has noticed a decrease in appetite and a weight loss of 34 lbs.  Discussed increasing the dose however the patient is still losing weight at current dose.       PLAN:   - Continue Zepbound 7.5 mg once weekly injection.  Prescription sent to [facility] per patient request.    - Continue diet and lifestyle changes to assist with weight loss.    Follow up: 2 months per patient request         Relevant Medications    tirzepatide, weight loss, (Zepbound) 7.5 mg/0.5 mL injection    Other Relevant Orders    Referral to Clinical Pharmacy     Grace BradyD    Continue all meds under the continuation of care with the referring provider and clinical pharmacy team.

## 2025-06-30 NOTE — ASSESSMENT & PLAN NOTE
Patient has a starting BMI of 52.18 kg/m².  Since last visit the patient increased the dose of Zepbound and reports very minimal nausea at this point. The patient has noticed a decrease in appetite and a weight loss of 34 lbs.  Discussed increasing the dose however the patient is still losing weight at current dose.       PLAN:   - Continue Zepbound 7.5 mg once weekly injection.  Prescription sent to [facility] per patient request.    - Continue diet and lifestyle changes to assist with weight loss.    Follow up: 2 months per patient request

## 2025-08-13 ENCOUNTER — APPOINTMENT (OUTPATIENT)
Dept: PRIMARY CARE | Facility: CLINIC | Age: 70
End: 2025-08-13
Payer: MEDICARE

## 2025-08-15 ENCOUNTER — APPOINTMENT (OUTPATIENT)
Dept: PRIMARY CARE | Facility: CLINIC | Age: 70
End: 2025-08-15
Payer: MEDICARE

## 2025-08-19 ENCOUNTER — APPOINTMENT (OUTPATIENT)
Dept: PRIMARY CARE | Facility: CLINIC | Age: 70
End: 2025-08-19
Payer: MEDICARE

## 2025-08-19 VITALS
HEART RATE: 82 BPM | WEIGHT: 279.1 LBS | TEMPERATURE: 98.7 F | RESPIRATION RATE: 12 BRPM | SYSTOLIC BLOOD PRESSURE: 118 MMHG | DIASTOLIC BLOOD PRESSURE: 72 MMHG | OXYGEN SATURATION: 94 % | HEIGHT: 64 IN | BODY MASS INDEX: 47.65 KG/M2

## 2025-08-19 DIAGNOSIS — E66.01 MORBID OBESITY WITH BODY MASS INDEX (BMI) OF 40.0 OR HIGHER (MULTI): ICD-10-CM

## 2025-08-19 DIAGNOSIS — Z95.2 STATUS POST COMBINED AORTIC ROOT AND VALVE REPLACEMENT USING STENTLESS BIOPROSTHETIC AORTIC VALVE: Chronic | ICD-10-CM

## 2025-08-19 DIAGNOSIS — I10 PRIMARY HYPERTENSION: Primary | ICD-10-CM

## 2025-08-19 DIAGNOSIS — Z95.4 STATUS POST COMBINED AORTIC ROOT AND VALVE REPLACEMENT USING STENTLESS BIOPROSTHETIC AORTIC VALVE: Chronic | ICD-10-CM

## 2025-08-19 DIAGNOSIS — F51.04 CHRONIC INSOMNIA: ICD-10-CM

## 2025-08-19 DIAGNOSIS — E78.00 ELEVATED LDL CHOLESTEROL LEVEL: ICD-10-CM

## 2025-08-19 DIAGNOSIS — Z00.00 ROUTINE GENERAL MEDICAL EXAMINATION AT HEALTH CARE FACILITY: ICD-10-CM

## 2025-08-19 DIAGNOSIS — E55.9 VITAMIN D DEFICIENCY: ICD-10-CM

## 2025-08-19 DIAGNOSIS — Z87.891 FORMER CIGARETTE SMOKER: ICD-10-CM

## 2025-08-19 DIAGNOSIS — R73.03 PREDIABETES: ICD-10-CM

## 2025-08-19 PROCEDURE — 3078F DIAST BP <80 MM HG: CPT | Performed by: FAMILY MEDICINE

## 2025-08-19 PROCEDURE — 1159F MED LIST DOCD IN RCRD: CPT | Performed by: FAMILY MEDICINE

## 2025-08-19 PROCEDURE — 1160F RVW MEDS BY RX/DR IN RCRD: CPT | Performed by: FAMILY MEDICINE

## 2025-08-19 PROCEDURE — G0439 PPPS, SUBSEQ VISIT: HCPCS | Performed by: FAMILY MEDICINE

## 2025-08-19 PROCEDURE — 3074F SYST BP LT 130 MM HG: CPT | Performed by: FAMILY MEDICINE

## 2025-08-19 PROCEDURE — 3008F BODY MASS INDEX DOCD: CPT | Performed by: FAMILY MEDICINE

## 2025-08-19 PROCEDURE — 1170F FXNL STATUS ASSESSED: CPT | Performed by: FAMILY MEDICINE

## 2025-08-19 RX ORDER — AMOXICILLIN 500 MG/1
2000 CAPSULE ORAL ONCE
Qty: 8 CAPSULE | Refills: 0 | Status: SHIPPED | OUTPATIENT
Start: 2025-08-19 | End: 2025-08-19

## 2025-08-19 ASSESSMENT — ENCOUNTER SYMPTOMS
NEUROLOGICAL NEGATIVE: 1
MUSCULOSKELETAL NEGATIVE: 1
RESPIRATORY NEGATIVE: 1
CONSTIPATION: 1
PSYCHIATRIC NEGATIVE: 1
NAUSEA: 1
CARDIOVASCULAR NEGATIVE: 1

## 2025-08-19 ASSESSMENT — PATIENT HEALTH QUESTIONNAIRE - PHQ9
SUM OF ALL RESPONSES TO PHQ9 QUESTIONS 1 AND 2: 0
2. FEELING DOWN, DEPRESSED OR HOPELESS: NOT AT ALL
1. LITTLE INTEREST OR PLEASURE IN DOING THINGS: NOT AT ALL

## 2025-08-19 ASSESSMENT — ACTIVITIES OF DAILY LIVING (ADL)
DOING_HOUSEWORK: INDEPENDENT
TAKING_MEDICATION: INDEPENDENT
GROCERY_SHOPPING: INDEPENDENT
MANAGING_FINANCES: INDEPENDENT
BATHING: INDEPENDENT
DRESSING: INDEPENDENT

## 2025-08-25 ENCOUNTER — APPOINTMENT (OUTPATIENT)
Dept: PHARMACY | Facility: HOSPITAL | Age: 70
End: 2025-08-25
Payer: MEDICARE

## 2025-08-25 DIAGNOSIS — E66.01 MORBID OBESITY WITH BODY MASS INDEX (BMI) OF 40.0 OR HIGHER (MULTI): Primary | ICD-10-CM

## 2025-09-22 ENCOUNTER — APPOINTMENT (OUTPATIENT)
Dept: PHARMACY | Facility: HOSPITAL | Age: 70
End: 2025-09-22
Payer: MEDICARE

## 2025-11-17 ENCOUNTER — APPOINTMENT (OUTPATIENT)
Dept: DERMATOLOGY | Facility: CLINIC | Age: 70
End: 2025-11-17
Payer: MEDICARE

## 2026-02-19 ENCOUNTER — APPOINTMENT (OUTPATIENT)
Dept: PRIMARY CARE | Facility: CLINIC | Age: 71
End: 2026-02-19
Payer: MEDICARE